# Patient Record
Sex: FEMALE | Race: WHITE | NOT HISPANIC OR LATINO | URBAN - METROPOLITAN AREA
[De-identification: names, ages, dates, MRNs, and addresses within clinical notes are randomized per-mention and may not be internally consistent; named-entity substitution may affect disease eponyms.]

---

## 2024-01-12 ENCOUNTER — EMERGENCY (EMERGENCY)
Facility: HOSPITAL | Age: 59
LOS: 0 days | Discharge: HOME | End: 2024-01-12
Attending: EMERGENCY MEDICINE
Payer: COMMERCIAL

## 2024-01-12 VITALS
WEIGHT: 160.06 LBS | TEMPERATURE: 98 F | HEART RATE: 86 BPM | RESPIRATION RATE: 18 BRPM | HEIGHT: 64 IN | SYSTOLIC BLOOD PRESSURE: 110 MMHG | DIASTOLIC BLOOD PRESSURE: 65 MMHG

## 2024-01-12 DIAGNOSIS — R10.9 UNSPECIFIED ABDOMINAL PAIN: ICD-10-CM

## 2024-01-12 DIAGNOSIS — Z88.8 ALLERGY STATUS TO OTHER DRUGS, MEDICAMENTS AND BIOLOGICAL SUBSTANCES: ICD-10-CM

## 2024-01-12 DIAGNOSIS — K57.30 DIVERTICULOSIS OF LARGE INTESTINE WITHOUT PERFORATION OR ABSCESS WITHOUT BLEEDING: ICD-10-CM

## 2024-01-12 DIAGNOSIS — K86.2 CYST OF PANCREAS: ICD-10-CM

## 2024-01-12 DIAGNOSIS — Z87.19 PERSONAL HISTORY OF OTHER DISEASES OF THE DIGESTIVE SYSTEM: ICD-10-CM

## 2024-01-12 DIAGNOSIS — R30.9 PAINFUL MICTURITION, UNSPECIFIED: ICD-10-CM

## 2024-01-12 LAB
ALBUMIN SERPL ELPH-MCNC: 4.7 G/DL — SIGNIFICANT CHANGE UP (ref 3.5–5.2)
ALBUMIN SERPL ELPH-MCNC: 4.7 G/DL — SIGNIFICANT CHANGE UP (ref 3.5–5.2)
ALP SERPL-CCNC: 69 U/L — SIGNIFICANT CHANGE UP (ref 30–115)
ALP SERPL-CCNC: 69 U/L — SIGNIFICANT CHANGE UP (ref 30–115)
ALT FLD-CCNC: 17 U/L — SIGNIFICANT CHANGE UP (ref 0–41)
ALT FLD-CCNC: 17 U/L — SIGNIFICANT CHANGE UP (ref 0–41)
ANION GAP SERPL CALC-SCNC: 10 MMOL/L — SIGNIFICANT CHANGE UP (ref 7–14)
ANION GAP SERPL CALC-SCNC: 10 MMOL/L — SIGNIFICANT CHANGE UP (ref 7–14)
APPEARANCE UR: CLEAR — SIGNIFICANT CHANGE UP
APPEARANCE UR: CLEAR — SIGNIFICANT CHANGE UP
AST SERPL-CCNC: 17 U/L — SIGNIFICANT CHANGE UP (ref 0–41)
AST SERPL-CCNC: 17 U/L — SIGNIFICANT CHANGE UP (ref 0–41)
BACTERIA # UR AUTO: NEGATIVE /HPF — SIGNIFICANT CHANGE UP
BACTERIA # UR AUTO: NEGATIVE /HPF — SIGNIFICANT CHANGE UP
BASOPHILS # BLD AUTO: 0.05 K/UL — SIGNIFICANT CHANGE UP (ref 0–0.2)
BASOPHILS # BLD AUTO: 0.05 K/UL — SIGNIFICANT CHANGE UP (ref 0–0.2)
BASOPHILS NFR BLD AUTO: 0.3 % — SIGNIFICANT CHANGE UP (ref 0–1)
BASOPHILS NFR BLD AUTO: 0.3 % — SIGNIFICANT CHANGE UP (ref 0–1)
BILIRUB SERPL-MCNC: 0.6 MG/DL — SIGNIFICANT CHANGE UP (ref 0.2–1.2)
BILIRUB SERPL-MCNC: 0.6 MG/DL — SIGNIFICANT CHANGE UP (ref 0.2–1.2)
BILIRUB UR-MCNC: NEGATIVE — SIGNIFICANT CHANGE UP
BILIRUB UR-MCNC: NEGATIVE — SIGNIFICANT CHANGE UP
BUN SERPL-MCNC: 56 MG/DL — HIGH (ref 10–20)
BUN SERPL-MCNC: 56 MG/DL — HIGH (ref 10–20)
CALCIUM SERPL-MCNC: 9.4 MG/DL — SIGNIFICANT CHANGE UP (ref 8.4–10.5)
CALCIUM SERPL-MCNC: 9.4 MG/DL — SIGNIFICANT CHANGE UP (ref 8.4–10.5)
CAST: 13 /LPF — HIGH (ref 0–4)
CAST: 13 /LPF — HIGH (ref 0–4)
CHLORIDE SERPL-SCNC: 104 MMOL/L — SIGNIFICANT CHANGE UP (ref 98–110)
CHLORIDE SERPL-SCNC: 104 MMOL/L — SIGNIFICANT CHANGE UP (ref 98–110)
CO2 SERPL-SCNC: 22 MMOL/L — SIGNIFICANT CHANGE UP (ref 17–32)
CO2 SERPL-SCNC: 22 MMOL/L — SIGNIFICANT CHANGE UP (ref 17–32)
COLOR SPEC: YELLOW — SIGNIFICANT CHANGE UP
COLOR SPEC: YELLOW — SIGNIFICANT CHANGE UP
CREAT SERPL-MCNC: 1.5 MG/DL — SIGNIFICANT CHANGE UP (ref 0.7–1.5)
CREAT SERPL-MCNC: 1.5 MG/DL — SIGNIFICANT CHANGE UP (ref 0.7–1.5)
DIFF PNL FLD: NEGATIVE — SIGNIFICANT CHANGE UP
DIFF PNL FLD: NEGATIVE — SIGNIFICANT CHANGE UP
EGFR: 40 ML/MIN/1.73M2 — LOW
EGFR: 40 ML/MIN/1.73M2 — LOW
EOSINOPHIL # BLD AUTO: 0.12 K/UL — SIGNIFICANT CHANGE UP (ref 0–0.7)
EOSINOPHIL # BLD AUTO: 0.12 K/UL — SIGNIFICANT CHANGE UP (ref 0–0.7)
EOSINOPHIL NFR BLD AUTO: 0.8 % — SIGNIFICANT CHANGE UP (ref 0–8)
EOSINOPHIL NFR BLD AUTO: 0.8 % — SIGNIFICANT CHANGE UP (ref 0–8)
GLUCOSE SERPL-MCNC: 100 MG/DL — HIGH (ref 70–99)
GLUCOSE SERPL-MCNC: 100 MG/DL — HIGH (ref 70–99)
GLUCOSE UR QL: NEGATIVE MG/DL — SIGNIFICANT CHANGE UP
GLUCOSE UR QL: NEGATIVE MG/DL — SIGNIFICANT CHANGE UP
HCT VFR BLD CALC: 46.8 % — SIGNIFICANT CHANGE UP (ref 37–47)
HCT VFR BLD CALC: 46.8 % — SIGNIFICANT CHANGE UP (ref 37–47)
HGB BLD-MCNC: 15.7 G/DL — SIGNIFICANT CHANGE UP (ref 12–16)
HGB BLD-MCNC: 15.7 G/DL — SIGNIFICANT CHANGE UP (ref 12–16)
IMM GRANULOCYTES NFR BLD AUTO: 0.7 % — HIGH (ref 0.1–0.3)
IMM GRANULOCYTES NFR BLD AUTO: 0.7 % — HIGH (ref 0.1–0.3)
KETONES UR-MCNC: NEGATIVE MG/DL — SIGNIFICANT CHANGE UP
KETONES UR-MCNC: NEGATIVE MG/DL — SIGNIFICANT CHANGE UP
LEUKOCYTE ESTERASE UR-ACNC: NEGATIVE — SIGNIFICANT CHANGE UP
LEUKOCYTE ESTERASE UR-ACNC: NEGATIVE — SIGNIFICANT CHANGE UP
LIDOCAIN IGE QN: 98 U/L — HIGH (ref 7–60)
LIDOCAIN IGE QN: 98 U/L — HIGH (ref 7–60)
LYMPHOCYTES # BLD AUTO: 14.1 % — LOW (ref 20.5–51.1)
LYMPHOCYTES # BLD AUTO: 14.1 % — LOW (ref 20.5–51.1)
LYMPHOCYTES # BLD AUTO: 2.07 K/UL — SIGNIFICANT CHANGE UP (ref 1.2–3.4)
LYMPHOCYTES # BLD AUTO: 2.07 K/UL — SIGNIFICANT CHANGE UP (ref 1.2–3.4)
MCHC RBC-ENTMCNC: 31.7 PG — HIGH (ref 27–31)
MCHC RBC-ENTMCNC: 31.7 PG — HIGH (ref 27–31)
MCHC RBC-ENTMCNC: 33.5 G/DL — SIGNIFICANT CHANGE UP (ref 32–37)
MCHC RBC-ENTMCNC: 33.5 G/DL — SIGNIFICANT CHANGE UP (ref 32–37)
MCV RBC AUTO: 94.5 FL — SIGNIFICANT CHANGE UP (ref 81–99)
MCV RBC AUTO: 94.5 FL — SIGNIFICANT CHANGE UP (ref 81–99)
MONOCYTES # BLD AUTO: 0.93 K/UL — HIGH (ref 0.1–0.6)
MONOCYTES # BLD AUTO: 0.93 K/UL — HIGH (ref 0.1–0.6)
MONOCYTES NFR BLD AUTO: 6.3 % — SIGNIFICANT CHANGE UP (ref 1.7–9.3)
MONOCYTES NFR BLD AUTO: 6.3 % — SIGNIFICANT CHANGE UP (ref 1.7–9.3)
NEUTROPHILS # BLD AUTO: 11.39 K/UL — HIGH (ref 1.4–6.5)
NEUTROPHILS # BLD AUTO: 11.39 K/UL — HIGH (ref 1.4–6.5)
NEUTROPHILS NFR BLD AUTO: 77.8 % — HIGH (ref 42.2–75.2)
NEUTROPHILS NFR BLD AUTO: 77.8 % — HIGH (ref 42.2–75.2)
NITRITE UR-MCNC: NEGATIVE — SIGNIFICANT CHANGE UP
NITRITE UR-MCNC: NEGATIVE — SIGNIFICANT CHANGE UP
NRBC # BLD: 0 /100 WBCS — SIGNIFICANT CHANGE UP (ref 0–0)
NRBC # BLD: 0 /100 WBCS — SIGNIFICANT CHANGE UP (ref 0–0)
PH UR: 5.5 — SIGNIFICANT CHANGE UP (ref 5–8)
PH UR: 5.5 — SIGNIFICANT CHANGE UP (ref 5–8)
PLATELET # BLD AUTO: 290 K/UL — SIGNIFICANT CHANGE UP (ref 130–400)
PLATELET # BLD AUTO: 290 K/UL — SIGNIFICANT CHANGE UP (ref 130–400)
PMV BLD: 10.3 FL — SIGNIFICANT CHANGE UP (ref 7.4–10.4)
PMV BLD: 10.3 FL — SIGNIFICANT CHANGE UP (ref 7.4–10.4)
POTASSIUM SERPL-MCNC: 5.6 MMOL/L — HIGH (ref 3.5–5)
POTASSIUM SERPL-MCNC: 5.6 MMOL/L — HIGH (ref 3.5–5)
POTASSIUM SERPL-SCNC: 5.6 MMOL/L — HIGH (ref 3.5–5)
POTASSIUM SERPL-SCNC: 5.6 MMOL/L — HIGH (ref 3.5–5)
PROT SERPL-MCNC: 7.3 G/DL — SIGNIFICANT CHANGE UP (ref 6–8)
PROT SERPL-MCNC: 7.3 G/DL — SIGNIFICANT CHANGE UP (ref 6–8)
PROT UR-MCNC: 30 MG/DL
PROT UR-MCNC: 30 MG/DL
RBC # BLD: 4.95 M/UL — SIGNIFICANT CHANGE UP (ref 4.2–5.4)
RBC # BLD: 4.95 M/UL — SIGNIFICANT CHANGE UP (ref 4.2–5.4)
RBC # FLD: 13.9 % — SIGNIFICANT CHANGE UP (ref 11.5–14.5)
RBC # FLD: 13.9 % — SIGNIFICANT CHANGE UP (ref 11.5–14.5)
RBC CASTS # UR COMP ASSIST: 2 /HPF — SIGNIFICANT CHANGE UP (ref 0–4)
RBC CASTS # UR COMP ASSIST: 2 /HPF — SIGNIFICANT CHANGE UP (ref 0–4)
SODIUM SERPL-SCNC: 136 MMOL/L — SIGNIFICANT CHANGE UP (ref 135–146)
SODIUM SERPL-SCNC: 136 MMOL/L — SIGNIFICANT CHANGE UP (ref 135–146)
SP GR SPEC: 1.02 — SIGNIFICANT CHANGE UP (ref 1–1.03)
SP GR SPEC: 1.02 — SIGNIFICANT CHANGE UP (ref 1–1.03)
SQUAMOUS # UR AUTO: 1 /HPF — SIGNIFICANT CHANGE UP (ref 0–5)
SQUAMOUS # UR AUTO: 1 /HPF — SIGNIFICANT CHANGE UP (ref 0–5)
UROBILINOGEN FLD QL: 1 MG/DL — SIGNIFICANT CHANGE UP (ref 0.2–1)
UROBILINOGEN FLD QL: 1 MG/DL — SIGNIFICANT CHANGE UP (ref 0.2–1)
WBC # BLD: 14.67 K/UL — HIGH (ref 4.8–10.8)
WBC # BLD: 14.67 K/UL — HIGH (ref 4.8–10.8)
WBC # FLD AUTO: 14.67 K/UL — HIGH (ref 4.8–10.8)
WBC # FLD AUTO: 14.67 K/UL — HIGH (ref 4.8–10.8)
WBC UR QL: 1 /HPF — SIGNIFICANT CHANGE UP (ref 0–5)
WBC UR QL: 1 /HPF — SIGNIFICANT CHANGE UP (ref 0–5)

## 2024-01-12 PROCEDURE — 85025 COMPLETE CBC W/AUTO DIFF WBC: CPT

## 2024-01-12 PROCEDURE — 74177 CT ABD & PELVIS W/CONTRAST: CPT | Mod: MA

## 2024-01-12 PROCEDURE — 87086 URINE CULTURE/COLONY COUNT: CPT

## 2024-01-12 PROCEDURE — 81001 URINALYSIS AUTO W/SCOPE: CPT

## 2024-01-12 PROCEDURE — 99284 EMERGENCY DEPT VISIT MOD MDM: CPT | Mod: 25

## 2024-01-12 PROCEDURE — 80053 COMPREHEN METABOLIC PANEL: CPT

## 2024-01-12 PROCEDURE — 36415 COLL VENOUS BLD VENIPUNCTURE: CPT

## 2024-01-12 PROCEDURE — 83690 ASSAY OF LIPASE: CPT

## 2024-01-12 PROCEDURE — 74177 CT ABD & PELVIS W/CONTRAST: CPT | Mod: 26,MA

## 2024-01-12 PROCEDURE — 99285 EMERGENCY DEPT VISIT HI MDM: CPT

## 2024-01-12 RX ORDER — SODIUM CHLORIDE 9 MG/ML
1000 INJECTION INTRAMUSCULAR; INTRAVENOUS; SUBCUTANEOUS ONCE
Refills: 0 | Status: COMPLETED | OUTPATIENT
Start: 2024-01-12 | End: 2024-01-12

## 2024-01-12 RX ORDER — CIPROFLOXACIN LACTATE 400MG/40ML
1 VIAL (ML) INTRAVENOUS
Qty: 20 | Refills: 0
Start: 2024-01-12 | End: 2024-01-21

## 2024-01-12 RX ORDER — METRONIDAZOLE 500 MG
1 TABLET ORAL
Qty: 20 | Refills: 0
Start: 2024-01-12 | End: 2024-01-21

## 2024-01-12 RX ADMIN — SODIUM CHLORIDE 1000 MILLILITER(S): 9 INJECTION INTRAMUSCULAR; INTRAVENOUS; SUBCUTANEOUS at 11:22

## 2024-01-12 NOTE — ED PROVIDER NOTE - PATIENT PORTAL LINK FT
You can access the FollowMyHealth Patient Portal offered by [facility] by registering at the following website: [email or web address]. By joining [employer]’s FollowMyHealth portal, you will also be able to view your health information using other applications (apps) compatible with our system. You can access the FollowMyHealth Patient Portal offered by [facility] by registering at the following website: [email or web address]. By joining [employer]’s FollowMyHealth portal, you will also be able to view your health information using other applications (apps) compatible with our system.

## 2024-01-12 NOTE — ED ADULT NURSE NOTE - NSFALLUNIVINTERV_ED_ALL_ED
Bed/Stretcher in lowest position, wheels locked, appropriate side rails in place/Call bell, personal items and telephone in reach/Instruct patient to call for assistance before getting out of bed/chair/stretcher/Non-slip footwear applied when patient is off stretcher/[place] to call system/Physically safe environment - no spills, clutter or unnecessary equipment/Purposeful proactive rounding/Room/bathroom lighting operational, light cord in reach Bed/Stretcher in lowest position, wheels locked, appropriate side rails in place/Call bell, personal items and telephone in reach/Instruct patient to call for assistance before getting out of bed/chair/stretcher/Non-slip footwear applied when patient is off stretcher/[place] to call system/Physically safe environment - no spills, clutter or unnecessary equipment/Purposeful proactive rounding/Room/bathroom lighting operational, light cord in reach

## 2024-01-12 NOTE — ED ADULT TRIAGE NOTE - AS HEIGHT TYPE
stated
Anal fistula    Diabetes mellitus  type II  Dyslipidemia    Hyperuricemia    Sprain, bicep

## 2024-01-12 NOTE — ED PROVIDER NOTE - PHYSICAL EXAMINATION
VITAL SIGNS: I have reviewed nursing notes and confirm.  CONSTITUTIONAL: well-appearing, non-toxic, NAD  SKIN: Warm dry, normal skin turgor  HEAD: NCAT  EYES: EOMI, PERRLA, no scleral icterus  ENT: Moist mucous membranes, normal pharynx with no erythema or exudates  NECK: Supple; non tender. Full ROM.   CARD: RRR, no murmurs, rubs or gallops  RESP: clear to ausculation b/l.  No rales, rhonchi, or wheezing.  ABD: soft, + BS, (+) LLQ tenderness, non-distended, no rebound or guarding. No CVA tenderness  EXT: Full ROM, no bony tenderness, no pedal edema, no calf tenderness  PSYCH: Cooperative, appropriate.

## 2024-01-12 NOTE — ED PROVIDER NOTE - OBJECTIVE STATEMENT
Patient is a 58-year-old female past medical history of diverticulitis presenting for evaluation of left sided abdominal pain for the last 3 days.  Patient states left upper quadrant pain moved to the left lower quadrant.  She denies any fevers, chills, nausea, vomiting, chest pain, shortness of breath, hematuria, diarrhea, constipation, Vaginal bleeding, vaginal discharge.  Patient admits to mild discomfort with urination but denies urinary frequency or urgency.  Patient is postmenopausal.

## 2024-01-12 NOTE — ED PROVIDER NOTE - CLINICAL SUMMARY MEDICAL DECISION MAKING FREE TEXT BOX
58-year-old woman, history of diverticulitis in the past complains of left-sided lower abdominal pain over the last 3 days without associated fever, chills, nausea, vomiting.  No urinary symptoms.  Vital signs, exam as noted, left lower quadrant tenderness without peritoneal signs.  Labs with mild leukocytosis of 15, lipase mildly elevated, UA not consistent with infection.  CT with mild sigmoid diverticulitis, incidental pancreatic head cyst noted as well.  Results discussed with patient and she will follow-up PMD.  Antibiotics prescribed.

## 2024-01-13 LAB
CULTURE RESULTS: NO GROWTH — SIGNIFICANT CHANGE UP
CULTURE RESULTS: NO GROWTH — SIGNIFICANT CHANGE UP
SPECIMEN SOURCE: SIGNIFICANT CHANGE UP
SPECIMEN SOURCE: SIGNIFICANT CHANGE UP

## 2024-01-29 PROBLEM — Z00.00 ENCOUNTER FOR PREVENTIVE HEALTH EXAMINATION: Status: ACTIVE | Noted: 2024-01-29

## 2024-01-30 ENCOUNTER — OUTPATIENT (OUTPATIENT)
Dept: OUTPATIENT SERVICES | Facility: HOSPITAL | Age: 59
LOS: 1 days | End: 2024-01-30
Payer: COMMERCIAL

## 2024-01-30 DIAGNOSIS — Z12.31 ENCOUNTER FOR SCREENING MAMMOGRAM FOR MALIGNANT NEOPLASM OF BREAST: ICD-10-CM

## 2024-01-30 PROCEDURE — 77063 BREAST TOMOSYNTHESIS BI: CPT

## 2024-01-30 PROCEDURE — 77067 SCR MAMMO BI INCL CAD: CPT

## 2024-01-30 PROCEDURE — 77067 SCR MAMMO BI INCL CAD: CPT | Mod: 26

## 2024-01-30 PROCEDURE — 77063 BREAST TOMOSYNTHESIS BI: CPT | Mod: 26

## 2024-01-31 DIAGNOSIS — Z12.31 ENCOUNTER FOR SCREENING MAMMOGRAM FOR MALIGNANT NEOPLASM OF BREAST: ICD-10-CM

## 2024-02-03 ENCOUNTER — OUTPATIENT (OUTPATIENT)
Dept: OUTPATIENT SERVICES | Facility: HOSPITAL | Age: 59
LOS: 1 days | End: 2024-02-03
Payer: COMMERCIAL

## 2024-02-03 DIAGNOSIS — Z00.8 ENCOUNTER FOR OTHER GENERAL EXAMINATION: ICD-10-CM

## 2024-02-03 DIAGNOSIS — E04.9 NONTOXIC GOITER, UNSPECIFIED: ICD-10-CM

## 2024-02-03 PROCEDURE — 76536 US EXAM OF HEAD AND NECK: CPT

## 2024-02-03 PROCEDURE — 76536 US EXAM OF HEAD AND NECK: CPT | Mod: 26

## 2024-02-04 DIAGNOSIS — E04.9 NONTOXIC GOITER, UNSPECIFIED: ICD-10-CM

## 2024-02-05 ENCOUNTER — OUTPATIENT (OUTPATIENT)
Dept: OUTPATIENT SERVICES | Facility: HOSPITAL | Age: 59
LOS: 1 days | End: 2024-02-05
Payer: COMMERCIAL

## 2024-02-05 DIAGNOSIS — Z00.8 ENCOUNTER FOR OTHER GENERAL EXAMINATION: ICD-10-CM

## 2024-02-05 DIAGNOSIS — Z13.820 ENCOUNTER FOR SCREENING FOR OSTEOPOROSIS: ICD-10-CM

## 2024-02-05 PROCEDURE — 77080 DXA BONE DENSITY AXIAL: CPT

## 2024-02-05 PROCEDURE — 77080 DXA BONE DENSITY AXIAL: CPT | Mod: 26

## 2024-02-06 DIAGNOSIS — Z13.820 ENCOUNTER FOR SCREENING FOR OSTEOPOROSIS: ICD-10-CM

## 2024-02-09 ENCOUNTER — APPOINTMENT (OUTPATIENT)
Dept: CARDIOLOGY | Facility: CLINIC | Age: 59
End: 2024-02-09
Payer: COMMERCIAL

## 2024-02-09 VITALS
WEIGHT: 163 LBS | DIASTOLIC BLOOD PRESSURE: 80 MMHG | BODY MASS INDEX: 27.83 KG/M2 | HEART RATE: 87 BPM | HEIGHT: 64 IN | SYSTOLIC BLOOD PRESSURE: 120 MMHG

## 2024-02-09 DIAGNOSIS — E11.9 TYPE 2 DIABETES MELLITUS W/OUT COMPLICATIONS: ICD-10-CM

## 2024-02-09 DIAGNOSIS — F17.200 NICOTINE DEPENDENCE, UNSPECIFIED, UNCOMPLICATED: ICD-10-CM

## 2024-02-09 PROCEDURE — 93000 ELECTROCARDIOGRAM COMPLETE: CPT

## 2024-02-09 PROCEDURE — 99204 OFFICE O/P NEW MOD 45 MIN: CPT | Mod: 25

## 2024-02-09 RX ORDER — ENALAPRIL MALEATE 20 MG/1
20 TABLET ORAL DAILY
Refills: 0 | Status: ACTIVE | COMMUNITY

## 2024-02-09 RX ORDER — METOPROLOL SUCCINATE 100 MG/1
100 TABLET, EXTENDED RELEASE ORAL DAILY
Refills: 0 | Status: ACTIVE | COMMUNITY

## 2024-02-09 RX ORDER — HYDROCHLOROTHIAZIDE 12.5 MG/1
12.5 TABLET ORAL
Qty: 90 | Refills: 1 | Status: ACTIVE | COMMUNITY
Start: 2024-02-09 | End: 1900-01-01

## 2024-02-09 RX ORDER — GLIPIZIDE 5 MG/1
5 TABLET, EXTENDED RELEASE ORAL DAILY
Refills: 0 | Status: ACTIVE | COMMUNITY

## 2024-02-09 RX ORDER — ROSUVASTATIN CALCIUM 5 MG/1
5 TABLET, FILM COATED ORAL DAILY
Refills: 0 | Status: ACTIVE | COMMUNITY

## 2024-02-09 RX ORDER — SPIRONOLACTONE 25 MG/1
25 TABLET ORAL DAILY
Refills: 0 | Status: DISCONTINUED | COMMUNITY
End: 2024-02-09

## 2024-02-09 RX ORDER — ASPIRIN 81 MG/1
81 TABLET, COATED ORAL DAILY
Refills: 0 | Status: ACTIVE | COMMUNITY

## 2024-02-10 PROBLEM — F17.200 CURRENT EVERY DAY SMOKER: Status: ACTIVE | Noted: 2024-02-09

## 2024-02-10 PROBLEM — E11.9 TYPE 2 DIABETES MELLITUS WITHOUT COMPLICATION, UNSPECIFIED WHETHER LONG TERM INSULIN USE: Status: ACTIVE | Noted: 2024-02-09

## 2024-02-10 NOTE — HISTORY OF PRESENT ILLNESS
[FreeTextEntry1] : 58 year old female with ?CVA 15 years ago, since then has mildly impaired speech, HTN, DM, HLD, diverticulitis.  Pt presents for a cardiac evaluation. She was referred by her PCP Dr. Dunne. She denies chest pain or SOB, walks 20k steps a day. She is c/o leg cramps at night, relieved when she gets up and walks around, for the past 3 months.    GFR 64 LDL 88 TSH 0.26.

## 2024-02-10 NOTE — REVIEW OF SYSTEMS
[Blurry Vision] : no blurred vision [Dyspnea on exertion] : not dyspnea during exertion [Palpitations] : no palpitations [Chest Discomfort] : no chest discomfort [Anxiety] : no anxiety [Negative] : Gastrointestinal

## 2024-02-10 NOTE — ASSESSMENT
[FreeTextEntry1] : 58 YOF smoker. ? prior CVA. HTN controlled but unable to continue both Enalapril and Aldactone due to hyperkalemia.  HLD. DMII. Leg cramps, varicose veins and reduced pedal pulses.  Plan: Continue Enalapril, Metoprolol. Switch from Aldactone to HCTZ 12.5 mg daily. Continue Rosuvastatin. Smoking cessation d/w patient. 2D ECHO. VADIM/PVR of the LE. Venous duplex with reflux study of the LE. F/u after the tests.  Jerald Howard MD

## 2024-02-10 NOTE — PHYSICAL EXAM
[Well Developed] : well developed [Well Nourished] : well nourished [No Acute Distress] : no acute distress [Normal Conjunctiva] : normal conjunctiva [Normal Venous Pressure] : normal venous pressure [No Carotid Bruit] : no carotid bruit [Normal S1, S2] : normal S1, S2 [No Murmur] : no murmur [No Rub] : no rub [No Gallop] : no gallop [Clear Lung Fields] : clear lung fields [Good Air Entry] : good air entry [No Respiratory Distress] : no respiratory distress  [Soft] : abdomen soft [Non Tender] : non-tender [No Masses/organomegaly] : no masses/organomegaly [Normal Bowel Sounds] : normal bowel sounds [Normal Gait] : normal gait [No Edema] : no edema [No Cyanosis] : no cyanosis [No Clubbing] : no clubbing [No Varicosities] : no varicosities [No Rash] : no rash [No Skin Lesions] : no skin lesions [Moves all extremities] : moves all extremities [No Focal Deficits] : no focal deficits [Normal Speech] : normal speech [Alert and Oriented] : alert and oriented [Normal memory] : normal memory [Venous varicosities] : venous varicosities [Diminished Pedal Pulses ___] : diminished pedal pulses [unfilled]

## 2024-02-21 ENCOUNTER — APPOINTMENT (OUTPATIENT)
Dept: CARDIOLOGY | Facility: CLINIC | Age: 59
End: 2024-02-21
Payer: COMMERCIAL

## 2024-02-21 PROCEDURE — 93970 EXTREMITY STUDY: CPT

## 2024-02-21 PROCEDURE — 93923 UPR/LXTR ART STDY 3+ LVLS: CPT

## 2024-04-18 ENCOUNTER — APPOINTMENT (OUTPATIENT)
Dept: CARDIOLOGY | Facility: CLINIC | Age: 59
End: 2024-04-18
Payer: COMMERCIAL

## 2024-04-18 VITALS — DIASTOLIC BLOOD PRESSURE: 80 MMHG | SYSTOLIC BLOOD PRESSURE: 138 MMHG

## 2024-04-18 VITALS
HEART RATE: 67 BPM | HEIGHT: 64 IN | DIASTOLIC BLOOD PRESSURE: 82 MMHG | SYSTOLIC BLOOD PRESSURE: 146 MMHG | WEIGHT: 177 LBS | BODY MASS INDEX: 30.22 KG/M2

## 2024-04-18 DIAGNOSIS — M10.9 GOUT, UNSPECIFIED: ICD-10-CM

## 2024-04-18 DIAGNOSIS — I10 ESSENTIAL (PRIMARY) HYPERTENSION: ICD-10-CM

## 2024-04-18 DIAGNOSIS — E78.5 HYPERLIPIDEMIA, UNSPECIFIED: ICD-10-CM

## 2024-04-18 DIAGNOSIS — M79.606 PAIN IN LEG, UNSPECIFIED: ICD-10-CM

## 2024-04-18 DIAGNOSIS — G25.0 ESSENTIAL TREMOR: ICD-10-CM

## 2024-04-18 PROCEDURE — 93306 TTE W/DOPPLER COMPLETE: CPT

## 2024-04-18 PROCEDURE — 93000 ELECTROCARDIOGRAM COMPLETE: CPT

## 2024-04-18 PROCEDURE — 99214 OFFICE O/P EST MOD 30 MIN: CPT | Mod: 25

## 2024-04-18 RX ORDER — ALLOPURINOL 100 MG/1
100 TABLET ORAL
Refills: 0 | Status: ACTIVE | COMMUNITY
Start: 2024-04-18

## 2024-04-18 NOTE — HISTORY OF PRESENT ILLNESS
[FreeTextEntry1] : 58 year old female with ?CVA 15 years ago, since then has mildly impaired speech, HTN, DM, HLD, diverticulitis, Tremors, Gout   Pt presents for a cardiac evaluation. She was referred by her PCP Dr. Dunne. She denies chest pain or SOB, walks 20k steps a day. She is c/o leg cramps at night, relieved when she gets up and walks around, for the past 3 months.    Pt is seen for f.u.  Denies chest pain, active all day. Denies SOB, no palpitations, no edema.  Pt decreased smoking to 2 to 3 cig per day 02/23/24 Venous Duplex no  DVT No PVD  VADIM with PVR WNL  Had TTE today   01/29/24 BUN 28/ Cr 1 GFR 64 LDL 88 Trig 69  TSH 0.26.

## 2024-04-18 NOTE — REVIEW OF SYSTEMS
[Negative] : Neurological [Blurry Vision] : no blurred vision [Dyspnea on exertion] : not dyspnea during exertion [Chest Discomfort] : no chest discomfort [Palpitations] : no palpitations [Anxiety] : no anxiety

## 2024-04-18 NOTE — PHYSICAL EXAM
[Well Developed] : well developed [Well Nourished] : well nourished [No Acute Distress] : no acute distress [Normal Conjunctiva] : normal conjunctiva [Normal Venous Pressure] : normal venous pressure [No Carotid Bruit] : no carotid bruit [Normal S1, S2] : normal S1, S2 [No Murmur] : no murmur [No Rub] : no rub [No Gallop] : no gallop [Clear Lung Fields] : clear lung fields [Good Air Entry] : good air entry [No Respiratory Distress] : no respiratory distress  [Soft] : abdomen soft [Non Tender] : non-tender [Normal Bowel Sounds] : normal bowel sounds [Normal Gait] : normal gait [No Edema] : no edema [No Cyanosis] : no cyanosis [No Clubbing] : no clubbing [Diminished Pedal Pulses ___] : diminished pedal pulses [unfilled] [Venous varicosities] : venous varicosities [No Rash] : no rash [No Skin Lesions] : no skin lesions [Moves all extremities] : moves all extremities [No Focal Deficits] : no focal deficits [Normal Speech] : normal speech [Alert and Oriented] : alert and oriented [Normal memory] : normal memory

## 2024-08-05 ENCOUNTER — RX RENEWAL (OUTPATIENT)
Age: 59
End: 2024-08-05

## 2024-09-12 ENCOUNTER — EMERGENCY (EMERGENCY)
Facility: HOSPITAL | Age: 59
LOS: 0 days | Discharge: HOME | End: 2024-09-12
Attending: STUDENT IN AN ORGANIZED HEALTH CARE EDUCATION/TRAINING PROGRAM
Payer: COMMERCIAL

## 2024-09-12 VITALS
TEMPERATURE: 98 F | RESPIRATION RATE: 17 BRPM | WEIGHT: 175.05 LBS | OXYGEN SATURATION: 97 % | HEART RATE: 86 BPM | DIASTOLIC BLOOD PRESSURE: 81 MMHG | SYSTOLIC BLOOD PRESSURE: 174 MMHG

## 2024-09-12 DIAGNOSIS — R73.03 PREDIABETES: ICD-10-CM

## 2024-09-12 DIAGNOSIS — M25.531 PAIN IN RIGHT WRIST: ICD-10-CM

## 2024-09-12 DIAGNOSIS — Z88.8 ALLERGY STATUS TO OTHER DRUGS, MEDICAMENTS AND BIOLOGICAL SUBSTANCES: ICD-10-CM

## 2024-09-12 DIAGNOSIS — M25.541 PAIN IN JOINTS OF RIGHT HAND: ICD-10-CM

## 2024-09-12 PROCEDURE — 96372 THER/PROPH/DIAG INJ SC/IM: CPT

## 2024-09-12 PROCEDURE — 73100 X-RAY EXAM OF WRIST: CPT | Mod: RT

## 2024-09-12 PROCEDURE — 99284 EMERGENCY DEPT VISIT MOD MDM: CPT

## 2024-09-12 PROCEDURE — 73100 X-RAY EXAM OF WRIST: CPT | Mod: 26,RT

## 2024-09-12 PROCEDURE — 99283 EMERGENCY DEPT VISIT LOW MDM: CPT | Mod: 25

## 2024-09-12 RX ORDER — DEXAMETHASONE 0.75 MG
10 TABLET ORAL ONCE
Refills: 0 | Status: COMPLETED | OUTPATIENT
Start: 2024-09-12 | End: 2024-09-12

## 2024-09-12 RX ORDER — KETOROLAC TROMETHAMINE 30 MG/ML
30 INJECTION, SOLUTION INTRAMUSCULAR ONCE
Refills: 0 | Status: DISCONTINUED | OUTPATIENT
Start: 2024-09-12 | End: 2024-09-12

## 2024-09-12 RX ADMIN — KETOROLAC TROMETHAMINE 30 MILLIGRAM(S): 30 INJECTION, SOLUTION INTRAMUSCULAR at 20:56

## 2024-09-12 RX ADMIN — Medication 10 MILLIGRAM(S): at 20:56

## 2024-09-12 NOTE — ED ADULT NURSE NOTE - NSFALLUNIVINTERV_ED_ALL_ED
Bed/Stretcher in lowest position, wheels locked, appropriate side rails in place/Call bell, personal items and telephone in reach/Instruct patient to call for assistance before getting out of bed/chair/stretcher/Non-slip footwear applied when patient is off stretcher/[place] to call system/Physically safe environment - no spills, clutter or unnecessary equipment/Purposeful proactive rounding/Room/bathroom lighting operational, light cord in reach

## 2024-09-12 NOTE — ED ADULT TRIAGE NOTE - CHIEF COMPLAINT QUOTE
Pt came c/o non-traumatic right wrist and hand swelling and pain started Monday. Pt has limited motion in the right hand and hand is warm to touch, +radial pulse and sensory.

## 2024-09-12 NOTE — ED PROVIDER NOTE - CLINICAL SUMMARY MEDICAL DECISION MAKING FREE TEXT BOX
59-year-old presented today for evaluation of right wrist and hand pain.  Patient's x-ray was grossly unremarkable.  Patient instructed to follow-up outpatient with orthopedics.  Discharge at this time.  Patient is neurovascularly intact at this time

## 2024-09-12 NOTE — ED PROVIDER NOTE - PATIENT PORTAL LINK FT
You can access the FollowMyHealth Patient Portal offered by [facility] by registering at the following website: [email or web address]. By joining [employer]s FollowMyHealth portal, you will also be able to view your health information using other applications (apps) compatible with our system.

## 2024-09-12 NOTE — ED PROVIDER NOTE - OBJECTIVE STATEMENT
59 yold female to ED Pmhx Diverticulitis, benign tremor, sciatica, pDm; pt presents to Ed c/o rigth wrist and hand pain x 4 days - possibly related to lifting a fedex box; pt with similar 3 yrs ago resolved spontaneously; lab work for gout neg 1 yrs ago; pt denies fever, chills, n/v; denies similar pain to other joints;

## 2024-09-12 NOTE — ED PROVIDER NOTE - CARE PROVIDER_API CALL
Agatha Dunne  Internal Medicine  [address] [place] [id number]  Phone: [phone]  Fax: [phone]  Established Patient  Follow Up Time: Routine

## 2024-09-12 NOTE — ED PROVIDER NOTE - PHYSICAL EXAMINATION
Vital Signs: I have reviewed the initial vital signs.  Constitutional: well-nourished, appears stated age, no acute distress  Cardiovascular: regular rate, regular rhythm, well-perfused extremities  Musculoskeletal: right wrist and hand: + swelling mild warmth, no redness or lymphangitis; limited flex/ext/pronation/supination at wrist due to pain; sensation and pulses intact; elbow, shoulder and neck non tender;

## 2024-09-12 NOTE — ED PROVIDER NOTE - NSFOLLOWUPINSTRUCTIONS_ED_ALL_ED_FT
Our Emergency Department Referral Coordinators will be reaching out to you in the next 24-48 hours from 9:00am to 5:00pm with a follow up appointment. Please expect a phone call from the hospital in that time frame. If you do not receive a call or if you have any questions or concerns, you can reach them at   [phone]      Wrist Pain, Adult  There are many things that can cause wrist pain. Some common causes include:    An injury to the wrist area, such as a sprain, strain, or fracture.  Overuse of the joint.  A condition that causes increased pressure on a nerve in the wrist (carpal tunnel syndrome).  Wear and tear of the joints that occurs with aging (osteoarthritis).  A variety of other types of arthritis.    Sometimes, the cause of wrist pain is not known. Often, the pain goes away when you follow instructions from your health care provider for relieving pain at home, such as resting or icing the wrist. If your wrist pain continues, it is important to tell your health care provider.    Follow these instructions at home:  Rest the wrist area for at least 48 hours or as long as told by your health care provider.  If a splint or elastic bandage has been applied, use it as told by your health care provider.    Remove the splint or bandage only as told by your health care provider.  Loosen the splint or bandage if your fingers tingle, become numb, or turn cold or blue.    ImageIf directed, apply ice to the injured area.    If you have a removable splint or elastic bandage, remove it as told by your health care provider.  Put ice in a plastic bag.  Place a towel between your skin and the bag or between your splint or bandage and the bag.  Leave the ice on for 20 minutes, 2–3 times a day.    Keep your arm raised (elevated) above the level of your heart while you are sitting or lying down.  Take over-the-counter and prescription medicines only as told by your health care provider.  Keep all follow-up visits as told by your health care provider. This is important.  Contact a health care provider if:  You have a sudden sharp pain in the wrist, hand, or arm that is different or new.  The swelling or bruising on your wrist or hand gets worse.  Your skin becomes red, gets a rash, or has open sores.  Your pain does not get better or it gets worse.  Get help right away if:  You lose feeling in your fingers or hand.  Your fingers turn white, very red, or cold and blue.  You cannot move your fingers.  You have a fever or chills.  This information is not intended to replace advice given to you by your health care provider. Make sure you discuss any questions you have with your health care provider.

## 2024-09-18 ENCOUNTER — EMERGENCY (EMERGENCY)
Facility: HOSPITAL | Age: 59
LOS: 0 days | Discharge: HOME | End: 2024-09-18
Attending: EMERGENCY MEDICINE
Payer: COMMERCIAL

## 2024-09-18 VITALS
OXYGEN SATURATION: 96 % | HEIGHT: 66 IN | HEART RATE: 84 BPM | DIASTOLIC BLOOD PRESSURE: 83 MMHG | TEMPERATURE: 99 F | WEIGHT: 175.05 LBS | SYSTOLIC BLOOD PRESSURE: 136 MMHG | RESPIRATION RATE: 18 BRPM

## 2024-09-18 DIAGNOSIS — M25.531 PAIN IN RIGHT WRIST: ICD-10-CM

## 2024-09-18 DIAGNOSIS — I10 ESSENTIAL (PRIMARY) HYPERTENSION: ICD-10-CM

## 2024-09-18 PROCEDURE — 99283 EMERGENCY DEPT VISIT LOW MDM: CPT

## 2024-09-18 PROCEDURE — 99284 EMERGENCY DEPT VISIT MOD MDM: CPT

## 2024-09-18 RX ORDER — IBUPROFEN 600 MG
600 TABLET ORAL ONCE
Refills: 0 | Status: COMPLETED | OUTPATIENT
Start: 2024-09-18 | End: 2024-09-18

## 2024-09-18 RX ADMIN — Medication 600 MILLIGRAM(S): at 22:42

## 2024-09-18 NOTE — ED PROVIDER NOTE - PATIENT PORTAL LINK FT
You can access the FollowMyHealth Patient Portal offered by [facility] by registering at the following website: [email or web address]. By joining [employer]’s FollowMyHealth portal, you will also be able to view your health information using other applications (apps) compatible with our system.

## 2024-09-18 NOTE — ED PROVIDER NOTE - NSFOLLOWUPINSTRUCTIONS_ED_ALL_ED_FT
FOLLOW UP WITH YOUR ORTHOPEDIC DOCTOR THIS WEEK FOR REEVALUATION AND OUTPATIENT MRI AS PLANNED     RETURN TO ED IMMEDIATELY WITH ANY WORSENING SYMPTOMS, CHEST PAIN, SHORTNESS OF BREATH, ABDOMINAL PAIN, FEVERS, WEAKNESS, DIZZINESS, PERSISTENT OR SEVERE HEADACHE OR ANY OTHER CONCERNS.     Sprain    A sprain is a stretch or tear in one of the tough, fiber-like tissues (ligaments) in your body. This is caused by an injury to the area such as a twisting mechanism. Symptoms include pain, swelling, or bruising. Rest that area over the next several days and slowly resume activity when tolerated. Ice can help with swelling and pain.     SEEK IMMEDIATE MEDICAL CARE IF YOU HAVE ANY OF THE FOLLOWING SYMPTOMS: worsening pain, inability to move that body part, numbness or tingling.

## 2024-09-18 NOTE — ED PROVIDER NOTE - ATTENDING CONTRIBUTION TO CARE
59-year-old female with PMH HTN, essential tremor presents for evaluation of right wrist pain.  Patient seen about a week ago with negative x-ray.  Patient followed up with her orthopedist, Dr. Evan Maynard, who continued the splint and wrote her a referral for outpatient MRI.  Patient comes tonight requesting an MRI in ED stating she is still having some discomfort.  No worsening symptoms, fevers, numbness, weakness, swelling, fevers, chills, chest pain or shortness of breath.  Patient states she cannot get her MRI outpatient which she had copy of x-ray results that were done in ED.  Denies any additional complaints.  No trauma or falls. Denies any hx alcohol use.    VITAL SIGNS: noted  CONSTITUTIONAL: Well-developed; well-nourished; in no acute distress  HEAD: Normocephalic; atraumatic  EYES: PERRL, EOM intact; conjunctiva and sclera clear  ENT: No nasal discharge; airway clear. MMM  NECK: Supple; non tender.    CARD: S1, S2 normal; no murmurs, gallops, or rubs. Regular rate and rhythm  RESP: CTAB/L, no wheezes, rales or rhonchi  ABD: Normal bowel sounds; soft; non-distended; non-tender   EXT: Normal ROM. Distal pulses intact  NEURO: Alert, oriented. Grossly unremarkable. No focal deficits. mild essential tremor, baseline per pt  SKIN: Skin exam is warm and dry,  right wrist with slight decreased ROM, no erythema, no swelling, no deformity, + mild snuff box tenderness, cap refill wnl

## 2024-09-18 NOTE — ED PROVIDER NOTE - CLINICAL SUMMARY MEDICAL DECISION MAKING FREE TEXT BOX
Patient evaluated for right wrist pain, states she has referral for outpatient MRI and is following with her orthopedist Dr. Maynard but needed a copy of her x-ray results in order to get approved for MRI.  Was not sure if she could get MRI in ED.  Advised to follow-up for scheduling MRI tomorrow and follow-up with Dr. Maynard as no change in symptoms, advised continuing use of splint.  Patient comfortable with follow-up instructions and will speak with Dr. Maynard tomorrow.  Patient given copies of imaging results and strict return precautions advised and patient verbalized understanding.

## 2024-09-18 NOTE — ED PROVIDER NOTE - OBJECTIVE STATEMENT
59-year-old female coming with complaints of wrist pain. Patient reports she hurt her wrist about 3 weeks ago, came to the ED 5 days ago where they had x-rays performed and discharged to follow-up with orthopedist after splinting. Went to orthopedist to give her diclofenac gel with some relief but still with some pain to her second right digit as well as right wrist (initial pain was digits 3 through 5 and wrist). Patient says she needs an MRI or her x-ray read results from prior visit. Denying any worsening pain or symptoms.

## 2024-09-18 NOTE — ED PROVIDER NOTE - CARE PROVIDER_API CALL
Bogdan, Sergey Valentinovich  Pain Medicine  [address] [address]  Phone: [phone]  Fax: [phone]  Established Patient  Follow Up Time: 1-3 Days    Agatha Dunne  Internal Medicine  [address] [place] [id number]  Phone: [phone]  Fax: [phone]  Established Patient  Follow Up Time: 1-3 Days

## 2024-09-18 NOTE — ED PROVIDER NOTE - PHYSICAL EXAMINATION
CONSTITUTIONAL: NAD  SKIN: Warm dry  HEAD: NCAT  EYES: NL inspection  EXT: Right upper extremity initially in splint, on splint removed. Full range of motion of elbow. Limited range of motion of wrist due to pain. No gross deformities. Compartments soft. Pulses plus, sensation normal. No scaphoid tenderness on this provider's exam  NEURO: Grossly unremarkable  PSYCH: Cooperative, appropriate.

## 2024-09-18 NOTE — ED PROVIDER NOTE - PROVIDER TOKENS
PROVIDER:[TOKEN:[[id number]],FOLLOWUP:[1-3 Days],ESTABLISHEDPATIENT:[T]],PROVIDER:[TOKEN:[[id number]],FOLLOWUP:[1-3 Days],ESTABLISHEDPATIENT:[T]]

## 2024-09-18 NOTE — ED ADULT TRIAGE NOTE - CHIEF COMPLAINT QUOTE
Pt c/o worsening pain to right wrist and 1st digit. Pt seen recently on 9/12 in ed for right wrist pain. Pt f/u outpatient with ortho prescribed diclofenac gel for pain with no relief. Pt able to move digits WNL with splint

## 2024-09-19 PROBLEM — M54.30 SCIATICA, UNSPECIFIED SIDE: Chronic | Status: ACTIVE | Noted: 2024-09-12

## 2024-09-19 PROBLEM — I10 ESSENTIAL (PRIMARY) HYPERTENSION: Chronic | Status: ACTIVE | Noted: 2024-09-12

## 2024-10-12 ENCOUNTER — EMERGENCY (EMERGENCY)
Facility: HOSPITAL | Age: 59
LOS: 0 days | Discharge: HOME | End: 2024-10-12
Attending: EMERGENCY MEDICINE
Payer: COMMERCIAL

## 2024-10-12 VITALS
WEIGHT: 175.05 LBS | SYSTOLIC BLOOD PRESSURE: 133 MMHG | OXYGEN SATURATION: 100 % | DIASTOLIC BLOOD PRESSURE: 78 MMHG | HEIGHT: 66 IN | HEART RATE: 86 BPM | TEMPERATURE: 98 F | RESPIRATION RATE: 18 BRPM

## 2024-10-12 DIAGNOSIS — Z88.8 ALLERGY STATUS TO OTHER DRUGS, MEDICAMENTS AND BIOLOGICAL SUBSTANCES: ICD-10-CM

## 2024-10-12 DIAGNOSIS — Z87.19 PERSONAL HISTORY OF OTHER DISEASES OF THE DIGESTIVE SYSTEM: ICD-10-CM

## 2024-10-12 DIAGNOSIS — R10.32 LEFT LOWER QUADRANT PAIN: ICD-10-CM

## 2024-10-12 DIAGNOSIS — K57.92 DIVERTICULITIS OF INTESTINE, PART UNSPECIFIED, WITHOUT PERFORATION OR ABSCESS WITHOUT BLEEDING: ICD-10-CM

## 2024-10-12 LAB
ALBUMIN SERPL ELPH-MCNC: 3.6 G/DL — SIGNIFICANT CHANGE UP (ref 3.5–5.2)
ALP SERPL-CCNC: 65 U/L — SIGNIFICANT CHANGE UP (ref 30–115)
ALT FLD-CCNC: 17 U/L — SIGNIFICANT CHANGE UP (ref 0–41)
ANION GAP SERPL CALC-SCNC: 9 MMOL/L — SIGNIFICANT CHANGE UP (ref 7–14)
APPEARANCE UR: ABNORMAL
AST SERPL-CCNC: 14 U/L — SIGNIFICANT CHANGE UP (ref 0–41)
BACTERIA # UR AUTO: NEGATIVE /HPF — SIGNIFICANT CHANGE UP
BASOPHILS # BLD AUTO: 0.03 K/UL — SIGNIFICANT CHANGE UP (ref 0–0.2)
BASOPHILS NFR BLD AUTO: 0.2 % — SIGNIFICANT CHANGE UP (ref 0–1)
BILIRUB SERPL-MCNC: 0.8 MG/DL — SIGNIFICANT CHANGE UP (ref 0.2–1.2)
BILIRUB UR-MCNC: NEGATIVE — SIGNIFICANT CHANGE UP
BUN SERPL-MCNC: 45 MG/DL — HIGH (ref 10–20)
CALCIUM SERPL-MCNC: 9.9 MG/DL — SIGNIFICANT CHANGE UP (ref 8.4–10.4)
CAST: 0 /LPF — SIGNIFICANT CHANGE UP (ref 0–4)
CHLORIDE SERPL-SCNC: 103 MMOL/L — SIGNIFICANT CHANGE UP (ref 98–110)
CO2 SERPL-SCNC: 25 MMOL/L — SIGNIFICANT CHANGE UP (ref 17–32)
COLOR SPEC: ABNORMAL
CREAT SERPL-MCNC: 1.8 MG/DL — HIGH (ref 0.7–1.5)
DIFF PNL FLD: ABNORMAL
EGFR: 32 ML/MIN/1.73M2 — LOW
EGFR: 32 ML/MIN/1.73M2 — LOW
EOSINOPHIL # BLD AUTO: 0.12 K/UL — SIGNIFICANT CHANGE UP (ref 0–0.7)
EOSINOPHIL NFR BLD AUTO: 0.7 % — SIGNIFICANT CHANGE UP (ref 0–8)
GLUCOSE SERPL-MCNC: 155 MG/DL — HIGH (ref 70–99)
GLUCOSE UR QL: NEGATIVE MG/DL — SIGNIFICANT CHANGE UP
HCG SERPL QL: NEGATIVE — SIGNIFICANT CHANGE UP
HCT VFR BLD CALC: 41.1 % — SIGNIFICANT CHANGE UP (ref 37–47)
HGB BLD-MCNC: 13.7 G/DL — SIGNIFICANT CHANGE UP (ref 12–16)
IMM GRANULOCYTES NFR BLD AUTO: 0.6 % — HIGH (ref 0.1–0.3)
KETONES UR-MCNC: NEGATIVE MG/DL — SIGNIFICANT CHANGE UP
LEUKOCYTE ESTERASE UR-ACNC: ABNORMAL
LIDOCAIN IGE QN: 95 U/L — HIGH (ref 7–60)
LYMPHOCYTES # BLD AUTO: 1.73 K/UL — SIGNIFICANT CHANGE UP (ref 1.2–3.4)
LYMPHOCYTES # BLD AUTO: 9.9 % — LOW (ref 20.5–51.1)
MCHC RBC-ENTMCNC: 31.9 PG — HIGH (ref 27–31)
MCHC RBC-ENTMCNC: 33.3 G/DL — SIGNIFICANT CHANGE UP (ref 32–37)
MCV RBC AUTO: 95.6 FL — SIGNIFICANT CHANGE UP (ref 81–99)
MONOCYTES # BLD AUTO: 0.99 K/UL — HIGH (ref 0.1–0.6)
MONOCYTES NFR BLD AUTO: 5.7 % — SIGNIFICANT CHANGE UP (ref 1.7–9.3)
NEUTROPHILS # BLD AUTO: 14.52 K/UL — HIGH (ref 1.4–6.5)
NEUTROPHILS NFR BLD AUTO: 82.9 % — HIGH (ref 42.2–75.2)
NITRITE UR-MCNC: NEGATIVE — SIGNIFICANT CHANGE UP
NRBC # BLD: 0 /100 WBCS — SIGNIFICANT CHANGE UP (ref 0–0)
NRBC BLD-RTO: 0 /100 WBCS — SIGNIFICANT CHANGE UP (ref 0–0)
PH UR: 5.5 — SIGNIFICANT CHANGE UP (ref 5–8)
PLATELET # BLD AUTO: 242 K/UL — SIGNIFICANT CHANGE UP (ref 130–400)
PMV BLD: 11 FL — HIGH (ref 7.4–10.4)
POTASSIUM SERPL-MCNC: 4.9 MMOL/L — SIGNIFICANT CHANGE UP (ref 3.5–5)
POTASSIUM SERPL-SCNC: 4.9 MMOL/L — SIGNIFICANT CHANGE UP (ref 3.5–5)
PROT SERPL-MCNC: 5.7 G/DL — LOW (ref 6–8)
PROT UR-MCNC: 300 MG/DL
RBC # BLD: 4.3 M/UL — SIGNIFICANT CHANGE UP (ref 4.2–5.4)
RBC # FLD: 13.7 % — SIGNIFICANT CHANGE UP (ref 11.5–14.5)
RBC CASTS # UR COMP ASSIST: 85 /HPF — HIGH (ref 0–4)
SODIUM SERPL-SCNC: 137 MMOL/L — SIGNIFICANT CHANGE UP (ref 135–146)
SP GR SPEC: 1.02 — SIGNIFICANT CHANGE UP (ref 1–1.03)
SQUAMOUS # UR AUTO: 4 /HPF — SIGNIFICANT CHANGE UP (ref 0–5)
UROBILINOGEN FLD QL: 0.2 MG/DL — SIGNIFICANT CHANGE UP (ref 0.2–1)
WBC # BLD: 17.49 K/UL — HIGH (ref 4.8–10.8)
WBC # FLD AUTO: 17.49 K/UL — HIGH (ref 4.8–10.8)
WBC UR QL: 21 /HPF — HIGH (ref 0–5)

## 2024-10-12 PROCEDURE — 99285 EMERGENCY DEPT VISIT HI MDM: CPT

## 2024-10-12 PROCEDURE — 87077 CULTURE AEROBIC IDENTIFY: CPT

## 2024-10-12 PROCEDURE — 81001 URINALYSIS AUTO W/SCOPE: CPT

## 2024-10-12 PROCEDURE — 85025 COMPLETE CBC W/AUTO DIFF WBC: CPT

## 2024-10-12 PROCEDURE — 87086 URINE CULTURE/COLONY COUNT: CPT

## 2024-10-12 PROCEDURE — 74177 CT ABD & PELVIS W/CONTRAST: CPT | Mod: MC

## 2024-10-12 PROCEDURE — 36415 COLL VENOUS BLD VENIPUNCTURE: CPT

## 2024-10-12 PROCEDURE — 96374 THER/PROPH/DIAG INJ IV PUSH: CPT | Mod: XU

## 2024-10-12 PROCEDURE — 83690 ASSAY OF LIPASE: CPT

## 2024-10-12 PROCEDURE — 80053 COMPREHEN METABOLIC PANEL: CPT

## 2024-10-12 PROCEDURE — 84703 CHORIONIC GONADOTROPIN ASSAY: CPT

## 2024-10-12 PROCEDURE — 99284 EMERGENCY DEPT VISIT MOD MDM: CPT | Mod: 25

## 2024-10-12 PROCEDURE — 74177 CT ABD & PELVIS W/CONTRAST: CPT | Mod: 26,MC

## 2024-10-12 PROCEDURE — 87186 SC STD MICRODIL/AGAR DIL: CPT

## 2024-10-12 RX ORDER — CIPROFLOXACIN HCL 250 MG
1 TABLET ORAL
Qty: 20 | Refills: 0
Start: 2024-10-12 | End: 2024-10-21

## 2024-10-12 RX ORDER — SODIUM CHLORIDE 9 G/1000ML
1000 INJECTION, SOLUTION INTRAVENOUS ONCE
Refills: 0 | Status: COMPLETED | OUTPATIENT
Start: 2024-10-12 | End: 2024-10-12

## 2024-10-12 RX ORDER — METRONIDAZOLE 250 MG
1 TABLET ORAL
Qty: 20 | Refills: 0
Start: 2024-10-12 | End: 2024-10-21

## 2024-10-12 RX ADMIN — Medication 8 MILLIGRAM(S): at 10:00

## 2024-10-12 RX ADMIN — SODIUM CHLORIDE 1000 MILLILITER(S): 9 INJECTION, SOLUTION INTRAVENOUS at 10:00

## 2024-10-15 ENCOUNTER — INPATIENT (INPATIENT)
Facility: HOSPITAL | Age: 59
LOS: 2 days | Discharge: HOME | DRG: 684 | End: 2024-10-18
Attending: INTERNAL MEDICINE | Admitting: STUDENT IN AN ORGANIZED HEALTH CARE EDUCATION/TRAINING PROGRAM
Payer: COMMERCIAL

## 2024-10-15 VITALS
TEMPERATURE: 98 F | RESPIRATION RATE: 20 BRPM | SYSTOLIC BLOOD PRESSURE: 188 MMHG | DIASTOLIC BLOOD PRESSURE: 97 MMHG | HEIGHT: 66 IN | WEIGHT: 175.05 LBS | HEART RATE: 90 BPM | OXYGEN SATURATION: 96 %

## 2024-10-15 PROCEDURE — 99285 EMERGENCY DEPT VISIT HI MDM: CPT

## 2024-10-15 RX ORDER — SODIUM CHLORIDE IRRIG SOLUTION 0.9 %
2000 SOLUTION, IRRIGATION IRRIGATION ONCE
Refills: 0 | Status: COMPLETED | OUTPATIENT
Start: 2024-10-15 | End: 2024-10-15

## 2024-10-15 RX ADMIN — Medication 2000 MILLILITER(S): at 23:24

## 2024-10-15 NOTE — ED ADULT TRIAGE NOTE - CHIEF COMPLAINT QUOTE
Pt. sent into ED by PCP due to abnormal lab work. As per pt. PCP reports creatinine levels of 3.1. Pt. also reports of lower abdominal pain radiating to lower back

## 2024-10-16 DIAGNOSIS — N17.9 ACUTE KIDNEY FAILURE, UNSPECIFIED: ICD-10-CM

## 2024-10-16 LAB
-  AMOXICILLIN/CLAVULANIC ACID: SIGNIFICANT CHANGE UP
-  AMPICILLIN/SULBACTAM: SIGNIFICANT CHANGE UP
-  AMPICILLIN: SIGNIFICANT CHANGE UP
-  AZTREONAM: SIGNIFICANT CHANGE UP
-  CEFAZOLIN: SIGNIFICANT CHANGE UP
-  CEFEPIME: SIGNIFICANT CHANGE UP
-  CEFOXITIN: SIGNIFICANT CHANGE UP
-  CEFTRIAXONE: SIGNIFICANT CHANGE UP
-  CIPROFLOXACIN: SIGNIFICANT CHANGE UP
-  ERTAPENEM: SIGNIFICANT CHANGE UP
-  GENTAMICIN: SIGNIFICANT CHANGE UP
-  IMIPENEM: SIGNIFICANT CHANGE UP
-  LEVOFLOXACIN: SIGNIFICANT CHANGE UP
-  MEROPENEM: SIGNIFICANT CHANGE UP
-  NITROFURANTOIN: SIGNIFICANT CHANGE UP
-  PIPERACILLIN/TAZOBACTAM: SIGNIFICANT CHANGE UP
-  TOBRAMYCIN: SIGNIFICANT CHANGE UP
-  TRIMETHOPRIM/SULFAMETHOXAZOLE: SIGNIFICANT CHANGE UP
A1C WITH ESTIMATED AVERAGE GLUCOSE RESULT: 6.5 % — HIGH (ref 4–5.6)
ALBUMIN SERPL ELPH-MCNC: 3.2 G/DL — LOW (ref 3.5–5.2)
ALBUMIN SERPL ELPH-MCNC: 3.8 G/DL — SIGNIFICANT CHANGE UP (ref 3.5–5.2)
ALP SERPL-CCNC: 45 U/L — SIGNIFICANT CHANGE UP (ref 30–115)
ALP SERPL-CCNC: 63 U/L — SIGNIFICANT CHANGE UP (ref 30–115)
ALT FLD-CCNC: 15 U/L — SIGNIFICANT CHANGE UP (ref 0–41)
ALT FLD-CCNC: 19 U/L — SIGNIFICANT CHANGE UP (ref 0–41)
AMYLASE P1 CFR SERPL: 84 U/L — SIGNIFICANT CHANGE UP (ref 25–115)
ANION GAP SERPL CALC-SCNC: 13 MMOL/L — SIGNIFICANT CHANGE UP (ref 7–14)
ANION GAP SERPL CALC-SCNC: 14 MMOL/L — SIGNIFICANT CHANGE UP (ref 7–14)
APPEARANCE UR: CLEAR — SIGNIFICANT CHANGE UP
AST SERPL-CCNC: 20 U/L — SIGNIFICANT CHANGE UP (ref 0–41)
AST SERPL-CCNC: 26 U/L — SIGNIFICANT CHANGE UP (ref 0–41)
BACTERIA # UR AUTO: NEGATIVE /HPF — SIGNIFICANT CHANGE UP
BASOPHILS # BLD AUTO: 0.03 K/UL — SIGNIFICANT CHANGE UP (ref 0–0.2)
BASOPHILS # BLD AUTO: 0.04 K/UL — SIGNIFICANT CHANGE UP (ref 0–0.2)
BASOPHILS NFR BLD AUTO: 0.5 % — SIGNIFICANT CHANGE UP (ref 0–1)
BASOPHILS NFR BLD AUTO: 0.5 % — SIGNIFICANT CHANGE UP (ref 0–1)
BILIRUB SERPL-MCNC: 0.4 MG/DL — SIGNIFICANT CHANGE UP (ref 0.2–1.2)
BILIRUB SERPL-MCNC: <0.2 MG/DL — SIGNIFICANT CHANGE UP (ref 0.2–1.2)
BILIRUB UR-MCNC: NEGATIVE — SIGNIFICANT CHANGE UP
BUN SERPL-MCNC: 51 MG/DL — HIGH (ref 10–20)
BUN SERPL-MCNC: 61 MG/DL — CRITICAL HIGH (ref 10–20)
CALCIUM SERPL-MCNC: 9 MG/DL — SIGNIFICANT CHANGE UP (ref 8.4–10.5)
CALCIUM SERPL-MCNC: 9.2 MG/DL — SIGNIFICANT CHANGE UP (ref 8.4–10.5)
CAST: 0 /LPF — SIGNIFICANT CHANGE UP (ref 0–4)
CHLORIDE SERPL-SCNC: 106 MMOL/L — SIGNIFICANT CHANGE UP (ref 98–110)
CHLORIDE SERPL-SCNC: 106 MMOL/L — SIGNIFICANT CHANGE UP (ref 98–110)
CHOLEST SERPL-MCNC: 181 MG/DL — SIGNIFICANT CHANGE UP
CK SERPL-CCNC: 121 U/L — SIGNIFICANT CHANGE UP (ref 0–225)
CO2 SERPL-SCNC: 21 MMOL/L — SIGNIFICANT CHANGE UP (ref 17–32)
CO2 SERPL-SCNC: 22 MMOL/L — SIGNIFICANT CHANGE UP (ref 17–32)
COLOR SPEC: YELLOW — SIGNIFICANT CHANGE UP
CREAT SERPL-MCNC: 2.4 MG/DL — HIGH (ref 0.7–1.5)
CREAT SERPL-MCNC: 3 MG/DL — HIGH (ref 0.7–1.5)
CULTURE RESULTS: ABNORMAL
DIFF PNL FLD: ABNORMAL
EGFR: 17 ML/MIN/1.73M2 — LOW
EGFR: 22 ML/MIN/1.73M2 — LOW
EOSINOPHIL # BLD AUTO: 0.12 K/UL — SIGNIFICANT CHANGE UP (ref 0–0.7)
EOSINOPHIL # BLD AUTO: 0.18 K/UL — SIGNIFICANT CHANGE UP (ref 0–0.7)
EOSINOPHIL NFR BLD AUTO: 2.1 % — SIGNIFICANT CHANGE UP (ref 0–8)
EOSINOPHIL NFR BLD AUTO: 2.2 % — SIGNIFICANT CHANGE UP (ref 0–8)
ESTIMATED AVERAGE GLUCOSE: 140 MG/DL — HIGH (ref 68–114)
GLUCOSE SERPL-MCNC: 105 MG/DL — HIGH (ref 70–99)
GLUCOSE SERPL-MCNC: 144 MG/DL — HIGH (ref 70–99)
GLUCOSE UR QL: NEGATIVE MG/DL — SIGNIFICANT CHANGE UP
HCT VFR BLD CALC: 34.5 % — LOW (ref 37–47)
HCT VFR BLD CALC: 37 % — SIGNIFICANT CHANGE UP (ref 37–47)
HDLC SERPL-MCNC: 38 MG/DL — LOW
HGB BLD-MCNC: 11.6 G/DL — LOW (ref 12–16)
HGB BLD-MCNC: 12.6 G/DL — SIGNIFICANT CHANGE UP (ref 12–16)
IMM GRANULOCYTES NFR BLD AUTO: 0.5 % — HIGH (ref 0.1–0.3)
IMM GRANULOCYTES NFR BLD AUTO: 0.6 % — HIGH (ref 0.1–0.3)
KETONES UR-MCNC: NEGATIVE MG/DL — SIGNIFICANT CHANGE UP
LEUKOCYTE ESTERASE UR-ACNC: ABNORMAL
LIDOCAIN IGE QN: 94 U/L — HIGH (ref 7–60)
LIPID PNL WITH DIRECT LDL SERPL: 116 MG/DL — HIGH
LYMPHOCYTES # BLD AUTO: 1.8 K/UL — SIGNIFICANT CHANGE UP (ref 1.2–3.4)
LYMPHOCYTES # BLD AUTO: 1.94 K/UL — SIGNIFICANT CHANGE UP (ref 1.2–3.4)
LYMPHOCYTES # BLD AUTO: 23.8 % — SIGNIFICANT CHANGE UP (ref 20.5–51.1)
LYMPHOCYTES # BLD AUTO: 31.7 % — SIGNIFICANT CHANGE UP (ref 20.5–51.1)
MCHC RBC-ENTMCNC: 31.7 PG — HIGH (ref 27–31)
MCHC RBC-ENTMCNC: 32 PG — HIGH (ref 27–31)
MCHC RBC-ENTMCNC: 33.6 G/DL — SIGNIFICANT CHANGE UP (ref 32–37)
MCHC RBC-ENTMCNC: 34.1 G/DL — SIGNIFICANT CHANGE UP (ref 32–37)
MCV RBC AUTO: 93.9 FL — SIGNIFICANT CHANGE UP (ref 81–99)
MCV RBC AUTO: 94.3 FL — SIGNIFICANT CHANGE UP (ref 81–99)
METHOD TYPE: SIGNIFICANT CHANGE UP
MONOCYTES # BLD AUTO: 0.72 K/UL — HIGH (ref 0.1–0.6)
MONOCYTES # BLD AUTO: 0.86 K/UL — HIGH (ref 0.1–0.6)
MONOCYTES NFR BLD AUTO: 10.6 % — HIGH (ref 1.7–9.3)
MONOCYTES NFR BLD AUTO: 12.7 % — HIGH (ref 1.7–9.3)
NEUTROPHILS # BLD AUTO: 2.97 K/UL — SIGNIFICANT CHANGE UP (ref 1.4–6.5)
NEUTROPHILS # BLD AUTO: 5.08 K/UL — SIGNIFICANT CHANGE UP (ref 1.4–6.5)
NEUTROPHILS NFR BLD AUTO: 52.5 % — SIGNIFICANT CHANGE UP (ref 42.2–75.2)
NEUTROPHILS NFR BLD AUTO: 62.3 % — SIGNIFICANT CHANGE UP (ref 42.2–75.2)
NITRITE UR-MCNC: NEGATIVE — SIGNIFICANT CHANGE UP
NON HDL CHOLESTEROL: 143 MG/DL — HIGH
NRBC # BLD: 0 /100 WBCS — SIGNIFICANT CHANGE UP (ref 0–0)
NRBC # BLD: 0 /100 WBCS — SIGNIFICANT CHANGE UP (ref 0–0)
ORGANISM # SPEC MICROSCOPIC CNT: ABNORMAL
ORGANISM # SPEC MICROSCOPIC CNT: SIGNIFICANT CHANGE UP
PH UR: 5.5 — SIGNIFICANT CHANGE UP (ref 5–8)
PLATELET # BLD AUTO: 224 K/UL — SIGNIFICANT CHANGE UP (ref 130–400)
PLATELET # BLD AUTO: 254 K/UL — SIGNIFICANT CHANGE UP (ref 130–400)
PMV BLD: 10.7 FL — HIGH (ref 7.4–10.4)
PMV BLD: 10.9 FL — HIGH (ref 7.4–10.4)
POTASSIUM SERPL-MCNC: 4.4 MMOL/L — SIGNIFICANT CHANGE UP (ref 3.5–5)
POTASSIUM SERPL-MCNC: 5 MMOL/L — SIGNIFICANT CHANGE UP (ref 3.5–5)
POTASSIUM SERPL-SCNC: 4.4 MMOL/L — SIGNIFICANT CHANGE UP (ref 3.5–5)
POTASSIUM SERPL-SCNC: 5 MMOL/L — SIGNIFICANT CHANGE UP (ref 3.5–5)
PROT SERPL-MCNC: 5 G/DL — LOW (ref 6–8)
PROT SERPL-MCNC: 5.9 G/DL — LOW (ref 6–8)
PROT UR-MCNC: 100 MG/DL
RBC # BLD: 3.66 M/UL — LOW (ref 4.2–5.4)
RBC # BLD: 3.94 M/UL — LOW (ref 4.2–5.4)
RBC # FLD: 13.4 % — SIGNIFICANT CHANGE UP (ref 11.5–14.5)
RBC # FLD: 13.4 % — SIGNIFICANT CHANGE UP (ref 11.5–14.5)
RBC CASTS # UR COMP ASSIST: 73 /HPF — HIGH (ref 0–4)
SODIUM SERPL-SCNC: 140 MMOL/L — SIGNIFICANT CHANGE UP (ref 135–146)
SODIUM SERPL-SCNC: 142 MMOL/L — SIGNIFICANT CHANGE UP (ref 135–146)
SP GR SPEC: 1.01 — SIGNIFICANT CHANGE UP (ref 1–1.03)
SPECIMEN SOURCE: SIGNIFICANT CHANGE UP
SQUAMOUS # UR AUTO: 1 /HPF — SIGNIFICANT CHANGE UP (ref 0–5)
TRIGL SERPL-MCNC: 137 MG/DL — SIGNIFICANT CHANGE UP
UROBILINOGEN FLD QL: 0.2 MG/DL — SIGNIFICANT CHANGE UP (ref 0.2–1)
WBC # BLD: 5.67 K/UL — SIGNIFICANT CHANGE UP (ref 4.8–10.8)
WBC # BLD: 8.15 K/UL — SIGNIFICANT CHANGE UP (ref 4.8–10.8)
WBC # FLD AUTO: 5.67 K/UL — SIGNIFICANT CHANGE UP (ref 4.8–10.8)
WBC # FLD AUTO: 8.15 K/UL — SIGNIFICANT CHANGE UP (ref 4.8–10.8)
WBC UR QL: 7 /HPF — HIGH (ref 0–5)

## 2024-10-16 PROCEDURE — 83735 ASSAY OF MAGNESIUM: CPT

## 2024-10-16 PROCEDURE — 36415 COLL VENOUS BLD VENIPUNCTURE: CPT

## 2024-10-16 PROCEDURE — 80048 BASIC METABOLIC PNL TOTAL CA: CPT

## 2024-10-16 PROCEDURE — 80061 LIPID PANEL: CPT

## 2024-10-16 PROCEDURE — 83036 HEMOGLOBIN GLYCOSYLATED A1C: CPT

## 2024-10-16 PROCEDURE — 76770 US EXAM ABDO BACK WALL COMP: CPT

## 2024-10-16 PROCEDURE — 83690 ASSAY OF LIPASE: CPT

## 2024-10-16 PROCEDURE — 99221 1ST HOSP IP/OBS SF/LOW 40: CPT

## 2024-10-16 PROCEDURE — 80053 COMPREHEN METABOLIC PANEL: CPT

## 2024-10-16 PROCEDURE — 82962 GLUCOSE BLOOD TEST: CPT

## 2024-10-16 PROCEDURE — 85025 COMPLETE CBC W/AUTO DIFF WBC: CPT

## 2024-10-16 PROCEDURE — 76770 US EXAM ABDO BACK WALL COMP: CPT | Mod: 26

## 2024-10-16 PROCEDURE — 82150 ASSAY OF AMYLASE: CPT

## 2024-10-16 RX ORDER — ALCOHOL ANTISEPTIC PADS
25 PADS, MEDICATED (EA) TOPICAL ONCE
Refills: 0 | Status: DISCONTINUED | OUTPATIENT
Start: 2024-10-16 | End: 2024-10-18

## 2024-10-16 RX ORDER — SODIUM CHLORIDE IRRIG SOLUTION 0.9 %
1000 SOLUTION, IRRIGATION IRRIGATION
Refills: 0 | Status: DISCONTINUED | OUTPATIENT
Start: 2024-10-16 | End: 2024-10-18

## 2024-10-16 RX ORDER — ALCOHOL ANTISEPTIC PADS
15 PADS, MEDICATED (EA) TOPICAL ONCE
Refills: 0 | Status: DISCONTINUED | OUTPATIENT
Start: 2024-10-16 | End: 2024-10-18

## 2024-10-16 RX ORDER — 5-HYDROXYTRYPTOPHAN (5-HTP) 100 MG
3 TABLET,DISINTEGRATING ORAL AT BEDTIME
Refills: 0 | Status: DISCONTINUED | OUTPATIENT
Start: 2024-10-16 | End: 2024-10-18

## 2024-10-16 RX ORDER — ASPIRIN 325 MG
0 TABLET ORAL
Refills: 0 | DISCHARGE

## 2024-10-16 RX ORDER — ALCOHOL ANTISEPTIC PADS
12.5 PADS, MEDICATED (EA) TOPICAL ONCE
Refills: 0 | Status: DISCONTINUED | OUTPATIENT
Start: 2024-10-16 | End: 2024-10-18

## 2024-10-16 RX ORDER — GLIPIZIDE 5 MG/1
1 TABLET ORAL
Refills: 0 | DISCHARGE

## 2024-10-16 RX ORDER — INSULIN LISPRO 100/ML
VIAL (ML) SUBCUTANEOUS
Refills: 0 | Status: DISCONTINUED | OUTPATIENT
Start: 2024-10-16 | End: 2024-10-18

## 2024-10-16 RX ORDER — CEFTRIAXONE SODIUM 1 G
1000 VIAL (EA) INJECTION EVERY 24 HOURS
Refills: 0 | Status: COMPLETED | OUTPATIENT
Start: 2024-10-16 | End: 2024-10-18

## 2024-10-16 RX ORDER — SITAGLIPTIN PHOSPHATE 100 MG
1 TABLET ORAL
Refills: 0 | DISCHARGE

## 2024-10-16 RX ORDER — MAG HYDROX/ALUMINUM HYD/SIMETH 200-200-20
30 SUSPENSION, ORAL (FINAL DOSE FORM) ORAL EVERY 4 HOURS
Refills: 0 | Status: DISCONTINUED | OUTPATIENT
Start: 2024-10-16 | End: 2024-10-18

## 2024-10-16 RX ORDER — ROSUVASTATIN CALCIUM 20 MG/1
10 TABLET, COATED ORAL AT BEDTIME
Refills: 0 | Status: DISCONTINUED | OUTPATIENT
Start: 2024-10-16 | End: 2024-10-18

## 2024-10-16 RX ORDER — GLUCAGON INJECTION, SOLUTION 0.5 MG/.1ML
1 INJECTION, SOLUTION SUBCUTANEOUS ONCE
Refills: 0 | Status: DISCONTINUED | OUTPATIENT
Start: 2024-10-16 | End: 2024-10-18

## 2024-10-16 RX ORDER — ONDANSETRON HCL/PF 4 MG/2 ML
4 VIAL (ML) INJECTION EVERY 8 HOURS
Refills: 0 | Status: DISCONTINUED | OUTPATIENT
Start: 2024-10-16 | End: 2024-10-18

## 2024-10-16 RX ORDER — METOPROLOL TARTRATE 50 MG
1 TABLET ORAL
Refills: 0 | DISCHARGE

## 2024-10-16 RX ORDER — ACETAMINOPHEN 325 MG
650 TABLET ORAL EVERY 6 HOURS
Refills: 0 | Status: DISCONTINUED | OUTPATIENT
Start: 2024-10-16 | End: 2024-10-18

## 2024-10-16 RX ORDER — METOPROLOL TARTRATE 50 MG
100 TABLET ORAL DAILY
Refills: 0 | Status: DISCONTINUED | OUTPATIENT
Start: 2024-10-16 | End: 2024-10-18

## 2024-10-16 RX ORDER — ASPIRIN 325 MG
81 TABLET ORAL DAILY
Refills: 0 | Status: DISCONTINUED | OUTPATIENT
Start: 2024-10-16 | End: 2024-10-18

## 2024-10-16 RX ORDER — ROSUVASTATIN CALCIUM 20 MG/1
1 TABLET, COATED ORAL
Refills: 0 | DISCHARGE

## 2024-10-16 RX ADMIN — Medication 500 MILLIGRAM(S): at 05:39

## 2024-10-16 RX ADMIN — Medication 75 MILLILITER(S): at 03:48

## 2024-10-16 RX ADMIN — Medication 100 MILLIGRAM(S): at 05:39

## 2024-10-16 RX ADMIN — Medication 5000 UNIT(S): at 05:39

## 2024-10-16 RX ADMIN — Medication 5000 UNIT(S): at 22:06

## 2024-10-16 RX ADMIN — ROSUVASTATIN CALCIUM 10 MILLIGRAM(S): 20 TABLET, COATED ORAL at 22:06

## 2024-10-16 RX ADMIN — Medication 81 MILLIGRAM(S): at 15:46

## 2024-10-16 RX ADMIN — Medication 75 MILLILITER(S): at 23:49

## 2024-10-16 RX ADMIN — Medication 5000 UNIT(S): at 15:14

## 2024-10-16 RX ADMIN — Medication 500 MILLIGRAM(S): at 15:14

## 2024-10-16 RX ADMIN — Medication 500 MILLIGRAM(S): at 22:06

## 2024-10-16 NOTE — H&P ADULT - ATTENDING COMMENTS
Pt has a pmhx of as above who is here because of elevated creatinine as found by her PMD. She had diverticulitis on the 12th and a positive UTI and was discharged with cipro and flagyl. It grew citrobacter. She is admitted for RUSSELL and UTI.     Vitals: HD stable, O2 stable  Gen: appropriate affect, no acute distress  Neuro: no focal defects, no sensory deficits  HEENT: EOMI, no JVD, normocephalic, atraumatic, no scleral icterus  Cardio: RRR, S1 S2 present, no murmurs, rubs, or gallops  Resp: lungs b/l CTA, chest wall intact  Abd: soft, nondistended, nontender  MSK: no gross joint abnormalities, no obvious swelling  Skin: no rashes or wounds  Heme: no ecchymosis or petechiae present    pain control  DVT ppx  GI ppx  diet  Code status  DC planning     I personally reviewed labs and imaging and ordered necessary testing/medications  I discussed care of the patient with licensed providers  I personally reviewed chart and consultant recommendations  Pt has complex medical issues that require extensive diagnosis and intervention / threat to life  I personally spent X minutes in care of patient. Pt has a pmhx of as above who is here because of elevated creatinine as found by her PMD. She had diverticulitis on the 12th and a positive UTI and was discharged with cipro and flagyl. It grew citrobacter. She is admitted for RUSSELL and UTI.     Vitals: HD stable, O2 stable  Gen: appropriate affect, no acute distress  Neuro: no focal defects, no sensory deficits  HEENT: EOMI, no JVD, normocephalic, atraumatic, no scleral icterus  Cardio: RRR, S1 S2 present, no murmurs, rubs, or gallops  Resp: lungs b/l CTA, chest wall intact  Abd: soft, nondistended, nontender  MSK: no gross joint abnormalities, no obvious swelling  Skin: no rashes or wounds  Heme: no ecchymosis or petechiae present    UTI  -cultures from the 12th grew citrobacter sensitive to ceftriaxone, will continue   -repeat cultures sent but I suspect that citrobacter will still be sensitive to citrobacter  -pt has been on cipro     RUSSELL  -Cr on the 12th was 1.8 -> on outpatient follow up the Cr was 3.1   -suspect prerenal in the setting of vasodilatory effects from infection (UTI and diverticulitis).   -here it is 3.0 down trending to 2.4   -continue LR at 75 cc per hour  -hold home enalapril at this time    hx of cva  -aspirin, statin    HTN  -can continue HCTZ  -metoprolol     acute diverticulitis   -pt has no abdominal pain at this time, continue flagyl for full 7 day course (abx started on 10/12)    diabetes hx  -pt with a hgb of 6.5  -hold on metformin at this time as pt has RUSSELL  -on januvia and glipizide at home  -continue ISS    DVT ppx - heparin subq  DC planning - remain admitted, monitor Cr levels     I personally reviewed labs and imaging and ordered necessary testing/medications  I discussed care of the patient with licensed providers  I personally reviewed chart and consultant recommendations  Pt has complex medical issues that require extensive diagnosis and intervention  I personally spent 40 minutes in care of patient.

## 2024-10-16 NOTE — H&P ADULT - ASSESSMENT
iv ciproflox   - F/U Ucx with sensitivites  - RBUS for stones  -     RUSSELL   CKD stage 3b    #HTN  #HLD  #DM      60 yo F w/ hx of CVA ?15 yrs ago without any residual deficits. urine stones, HTN, HLD, DM, essential tremor referred to the ED by her PMD for elevated creat on routine testing. CT Abdomen and Pelvis w/ IV Cont from 10/12 showed : Acute sigmoid diverticulitis. No bowel obstruction or drainable collection. Indeterminate right renal hypodense lesion. Stable (0.7 cm pancreatic head hypodense lesion (was 0.4cm in 1/12/24)) pancreatic head hypodense lesion may represent a cyst. No definite duct dilatation. Labs: WBC wnl, BUN/Cr 61/3, UA with trace LAE compared to 10/12, Ucx from 10/12 growing citrobacter koseri.     #RUSSELL on CKD   - Likely prerenal but could be 2/2 cipro as well   - IV gentle hydration   - holding enalapril    #UTI   #Citrobacter koseri  - IV rocephin   - F/U Ucx with sensitivites  - RBUS for stones    #Acute diverticulitis but asymptomatic  - IV flagyl     #HTN  #HLD  #DM  - C/W home meds   - holding enalapril  - FS & SSi    #Stable (0.7 cm pancreatic head hypodense lesion (was 0.4cm in 1/12/24)) pancreatic head hypodense lesion  - Per pt she had an MRI done in the previous year, consulted GI for the same and was told it was non concerning.   - Asked pt to bring records.     #Active smoker   - Counseled on cessation    #MISC  - Diet - DASH  - DVT - heparin  - GI prophy - na  - Activity - AAT

## 2024-10-16 NOTE — PATIENT PROFILE ADULT - FALL HARM RISK - HARM RISK INTERVENTIONS

## 2024-10-16 NOTE — H&P ADULT - NSHPPHYSICALEXAM_GEN_ALL_CORE
GENERAL: NAD  HEART: Regular rate and rhythm, no murmurs, rubs, or gallops  LUNGS: Unlabored respirations.  Clear to auscultation bilaterally, no crackles, wheezing, or rhonchi  ABDOMEN: Soft, nontender, nondistended, +BS  EXTREMITIES: 2+ peripheral pulses bilaterally. No clubbing, cyanosis, or edema  NERVOUS SYSTEM:  A&Ox3, moving all extremities, no focal deficits

## 2024-10-16 NOTE — ED PROVIDER NOTE - PHYSICAL EXAMINATION
CONSTITUTIONAL: Well-developed; well-nourished; in no acute distress.   SKIN: warm, dry  HEAD: Normocephalic; atraumatic.  EYES: PERRL, EOMI, no conjunctival erythema  ENT: No nasal discharge; airway clear.  NECK: Supple; non tender.  CARD: S1, S2 normal; no murmurs, gallops, or rubs. Regular rate and rhythm.   RESP: No wheezes, rales or rhonchi.  ABD: soft nd, mild tenderness to the LLQ. no cva tenderness.   EXT: Normal ROM.  No clubbing, cyanosis or edema.   NEURO: Alert, oriented, grossly unremarkable.  PSYCH: Cooperative, appropriate.

## 2024-10-16 NOTE — ED ADULT NURSE NOTE - NSFALLUNIVINTERV_ED_ALL_ED
Bed/Stretcher in lowest position, wheels locked, appropriate side rails in place/Call bell, personal items and telephone in reach/Instruct patient to call for assistance before getting out of bed/chair/stretcher/Non-slip footwear applied when patient is off stretcher/[place] to call system/Physically safe environment - no spills, clutter or unnecessary equipment/Purposeful proactive rounding/Room/bathroom lighting operational, light cord in reach

## 2024-10-16 NOTE — ED PROVIDER NOTE - CLINICAL SUMMARY MEDICAL DECISION MAKING FREE TEXT BOX
60 yo F w/ hx of HTN, HLD, DM, essential tremor referred to admission for RUSSELL, creatinine increased from 1.8 -> 3. BUN/creatinine >20. rpt labs same as outpatient ones. wbc improved from previous ED visit where she was diagnosed with diverticulitis. given iv fluids. admitted for further work up.

## 2024-10-16 NOTE — ED PROVIDER NOTE - OBJECTIVE STATEMENT
58 yo F w/ hx of HTN, HLD, DM, essential tremor referred to admission for RUSSELL. recently diagnosed with diverticulitis and UTI, on ciprofloxacin/ flagyl, had repeat labs with her PCP and was called back for RUSSELL with creatinine 3.1 endorses dark urine. no fevers. abd pain improving from last visit. no vomiting/diarrhea, no dysuria, no rashes. denies flank pain.

## 2024-10-16 NOTE — H&P ADULT - HISTORY OF PRESENT ILLNESS
58 yo F w/ hx of HTN, HLD, DM, essential tremor referred to admission for RUSSELL. recently diagnosed with diverticulitis and UTI, on ciprofloxacin/ flagyl, had repeat labs with her PCP and was called back for RUSSELL with creatinine 3.1 endorses dark urine. no fevers. abd pain improving from last visit. no vomiting/diarrhea, no dysuria, no rashes. denies flank pain.  Pt. sent into ED by PCP due to abnormal lab work. As per pt. PCP reports creatinine levels of 3.1. Pt. also reports of lower abdominal pain radiating to lower back · Temp at ED Arrival (C)	36.8 Degrees C · O2 Delivery/Oxygen Delivery Method	room air · SpO2 (%)	96 % · Temp site	oral · Temp (C)	36.8 Degrees C · Temp (F)	98.3 Degrees F · Respiration Rate (breaths/min)	20 /min · Heart Rate	90 /min · BP Diastolic	  97 mm Hg · BP Systolic	  188 mm Hg  CT Abdomen and Pelvis w/ IV Cont from 10/12 showed : Acute sigmoid diverticulitis. No bowel obstruction or drainable collection. Indeterminate right renal hypodense lesion. Recommend outpatient MRI renal with and without contrast for further evaluation.  Stable (0.7 cm pancreatic head hypodense lesion (was 0.4cm in 1/12/24)) pancreatic head hypodense lesion may represent a cyst. No definite duct dilatation. This can also be evaluated with MRI with MRCP.  Labs: WBC wnl, BUN/Cr 61/3, UA with trace LAE compared to 10/12, Ucx from 10/12 growing citrobacter koseri.   Patient is a 58 yo F w/ hx of CVA ?15 yrs ago without any residual deficits. urine stones, HTN, HLD, DM, essential tremor referred to the ED by her PMD for elevated creat on routine testing. She recently was diagnosed with diverticulitis and UTI, & DCed on PO ciprofloxacin/ flagyl, which had improved her urinary symptoms. She currently has no symptoms including any dysuria, diarrhea or abdominal pain. Apart from this pt has been an active smoker almost all her life and continues to smoke, and sees a pain specialist for sciatica   · Temp at ED Arrival (C)	36.8 Degrees C · O2 Delivery/Oxygen Delivery Method	room air · SpO2 (%)	96 % · Temp site	oral · Temp (C)	36.8 Degrees C · Temp (F)	98.3 Degrees F · Respiration Rate (breaths/min)	20 /min · Heart Rate	90 /min · BP Diastolic	  97 mm Hg · BP Systolic	  188 mm Hg  CT Abdomen and Pelvis w/ IV Cont from 10/12 showed : Acute sigmoid diverticulitis. No bowel obstruction or drainable collection. Indeterminate right renal hypodense lesion. Recommend outpatient MRI renal with and without contrast for further evaluation.  Stable (0.7 cm pancreatic head hypodense lesion (was 0.4cm in 1/12/24)) pancreatic head hypodense lesion may represent a cyst. No definite duct dilatation. This can also be evaluated with MRI with MRCP.  Labs: WBC wnl, BUN/Cr 61/3, UA with trace LAE compared to 10/12, Ucx from 10/12 growing citrobacter koseri.

## 2024-10-17 LAB
ALBUMIN SERPL ELPH-MCNC: 3.3 G/DL — LOW (ref 3.5–5.2)
ALP SERPL-CCNC: 49 U/L — SIGNIFICANT CHANGE UP (ref 30–115)
ALT FLD-CCNC: 18 U/L — SIGNIFICANT CHANGE UP (ref 0–41)
ANION GAP SERPL CALC-SCNC: 16 MMOL/L — HIGH (ref 7–14)
ANION GAP SERPL CALC-SCNC: 9 MMOL/L — SIGNIFICANT CHANGE UP (ref 7–14)
AST SERPL-CCNC: 21 U/L — SIGNIFICANT CHANGE UP (ref 0–41)
BASOPHILS # BLD AUTO: 0.03 K/UL — SIGNIFICANT CHANGE UP (ref 0–0.2)
BASOPHILS NFR BLD AUTO: 0.6 % — SIGNIFICANT CHANGE UP (ref 0–1)
BILIRUB SERPL-MCNC: 0.3 MG/DL — SIGNIFICANT CHANGE UP (ref 0.2–1.2)
BUN SERPL-MCNC: 38 MG/DL — HIGH (ref 10–20)
BUN SERPL-MCNC: 40 MG/DL — HIGH (ref 10–20)
CALCIUM SERPL-MCNC: 9.2 MG/DL — SIGNIFICANT CHANGE UP (ref 8.4–10.5)
CALCIUM SERPL-MCNC: 9.4 MG/DL — SIGNIFICANT CHANGE UP (ref 8.4–10.5)
CHLORIDE SERPL-SCNC: 102 MMOL/L — SIGNIFICANT CHANGE UP (ref 98–110)
CHLORIDE SERPL-SCNC: 107 MMOL/L — SIGNIFICANT CHANGE UP (ref 98–110)
CO2 SERPL-SCNC: 21 MMOL/L — SIGNIFICANT CHANGE UP (ref 17–32)
CO2 SERPL-SCNC: 24 MMOL/L — SIGNIFICANT CHANGE UP (ref 17–32)
CREAT SERPL-MCNC: 2 MG/DL — HIGH (ref 0.7–1.5)
CREAT SERPL-MCNC: 2.1 MG/DL — HIGH (ref 0.7–1.5)
EGFR: 26 ML/MIN/1.73M2 — LOW
EGFR: 28 ML/MIN/1.73M2 — LOW
EOSINOPHIL # BLD AUTO: 0.16 K/UL — SIGNIFICANT CHANGE UP (ref 0–0.7)
EOSINOPHIL NFR BLD AUTO: 3.2 % — SIGNIFICANT CHANGE UP (ref 0–8)
GLUCOSE BLDC GLUCOMTR-MCNC: 115 MG/DL — HIGH (ref 70–99)
GLUCOSE BLDC GLUCOMTR-MCNC: 141 MG/DL — HIGH (ref 70–99)
GLUCOSE BLDC GLUCOMTR-MCNC: 156 MG/DL — HIGH (ref 70–99)
GLUCOSE SERPL-MCNC: 116 MG/DL — HIGH (ref 70–99)
GLUCOSE SERPL-MCNC: 139 MG/DL — HIGH (ref 70–99)
HCT VFR BLD CALC: 35 % — LOW (ref 37–47)
HGB BLD-MCNC: 11.8 G/DL — LOW (ref 12–16)
IMM GRANULOCYTES NFR BLD AUTO: 0.4 % — HIGH (ref 0.1–0.3)
LYMPHOCYTES # BLD AUTO: 1.42 K/UL — SIGNIFICANT CHANGE UP (ref 1.2–3.4)
LYMPHOCYTES # BLD AUTO: 28.6 % — SIGNIFICANT CHANGE UP (ref 20.5–51.1)
MAGNESIUM SERPL-MCNC: 2 MG/DL — SIGNIFICANT CHANGE UP (ref 1.8–2.4)
MCHC RBC-ENTMCNC: 31.9 PG — HIGH (ref 27–31)
MCHC RBC-ENTMCNC: 33.7 G/DL — SIGNIFICANT CHANGE UP (ref 32–37)
MCV RBC AUTO: 94.6 FL — SIGNIFICANT CHANGE UP (ref 81–99)
MONOCYTES # BLD AUTO: 0.58 K/UL — SIGNIFICANT CHANGE UP (ref 0.1–0.6)
MONOCYTES NFR BLD AUTO: 11.7 % — HIGH (ref 1.7–9.3)
NEUTROPHILS # BLD AUTO: 2.75 K/UL — SIGNIFICANT CHANGE UP (ref 1.4–6.5)
NEUTROPHILS NFR BLD AUTO: 55.5 % — SIGNIFICANT CHANGE UP (ref 42.2–75.2)
NRBC # BLD: 0 /100 WBCS — SIGNIFICANT CHANGE UP (ref 0–0)
PLATELET # BLD AUTO: 234 K/UL — SIGNIFICANT CHANGE UP (ref 130–400)
PMV BLD: 10.7 FL — HIGH (ref 7.4–10.4)
POTASSIUM SERPL-MCNC: 4.3 MMOL/L — SIGNIFICANT CHANGE UP (ref 3.5–5)
POTASSIUM SERPL-MCNC: 5.7 MMOL/L — HIGH (ref 3.5–5)
POTASSIUM SERPL-SCNC: 4.3 MMOL/L — SIGNIFICANT CHANGE UP (ref 3.5–5)
POTASSIUM SERPL-SCNC: 5.7 MMOL/L — HIGH (ref 3.5–5)
PROT SERPL-MCNC: 5.2 G/DL — LOW (ref 6–8)
RBC # BLD: 3.7 M/UL — LOW (ref 4.2–5.4)
RBC # FLD: 13.4 % — SIGNIFICANT CHANGE UP (ref 11.5–14.5)
SODIUM SERPL-SCNC: 139 MMOL/L — SIGNIFICANT CHANGE UP (ref 135–146)
SODIUM SERPL-SCNC: 140 MMOL/L — SIGNIFICANT CHANGE UP (ref 135–146)
WBC # BLD: 4.96 K/UL — SIGNIFICANT CHANGE UP (ref 4.8–10.8)
WBC # FLD AUTO: 4.96 K/UL — SIGNIFICANT CHANGE UP (ref 4.8–10.8)

## 2024-10-17 PROCEDURE — 99232 SBSQ HOSP IP/OBS MODERATE 35: CPT | Mod: GC

## 2024-10-17 RX ORDER — SODIUM ZIRCONIUM CYCLOSILICATE 10 G/10G
5 POWDER, FOR SUSPENSION ORAL ONCE
Refills: 0 | Status: COMPLETED | OUTPATIENT
Start: 2024-10-17 | End: 2024-10-17

## 2024-10-17 RX ADMIN — Medication 0: at 11:39

## 2024-10-17 RX ADMIN — Medication 100 MILLIGRAM(S): at 05:27

## 2024-10-17 RX ADMIN — Medication 5000 UNIT(S): at 05:28

## 2024-10-17 RX ADMIN — Medication 81 MILLIGRAM(S): at 11:39

## 2024-10-17 RX ADMIN — SODIUM ZIRCONIUM CYCLOSILICATE 5 GRAM(S): 10 POWDER, FOR SUSPENSION ORAL at 12:27

## 2024-10-17 RX ADMIN — Medication 5000 UNIT(S): at 21:51

## 2024-10-17 RX ADMIN — Medication 100 MILLIGRAM(S): at 05:19

## 2024-10-17 RX ADMIN — Medication 5000 UNIT(S): at 13:19

## 2024-10-17 RX ADMIN — Medication 500 MILLIGRAM(S): at 13:19

## 2024-10-17 RX ADMIN — ROSUVASTATIN CALCIUM 10 MILLIGRAM(S): 20 TABLET, COATED ORAL at 21:52

## 2024-10-17 RX ADMIN — Medication 75 MILLILITER(S): at 12:50

## 2024-10-17 RX ADMIN — Medication 500 MILLIGRAM(S): at 21:52

## 2024-10-17 RX ADMIN — Medication 500 MILLIGRAM(S): at 05:27

## 2024-10-17 NOTE — PROGRESS NOTE ADULT - SUBJECTIVE AND OBJECTIVE BOX
24H events:    Patient is a 59y old Female who presents with a chief complaint of UTI, RUSSELL (16 Oct 2024 01:42)    Primary diagnosis of RUSSELL (acute kidney injury)    No acute or major events overnight.    Today is 1d of hospitalization. This morning patient was seen and examined at bedside, resting comfortably in bed.  No pain, SOB, or complaints. Says she feels fine.         PAST MEDICAL & SURGICAL HISTORY  HTN (hypertension)    Sciatica    No significant past surgical history      SOCIAL HISTORY:  Social History:      ALLERGIES:  Norvasc (Unknown)    MEDICATIONS:  STANDING MEDICATIONS  aspirin enteric coated 81 milliGRAM(s) Oral daily  cefTRIAXone   IVPB 1000 milliGRAM(s) IV Intermittent every 24 hours  dextrose 5%. 1000 milliLiter(s) IV Continuous <Continuous>  dextrose 5%. 1000 milliLiter(s) IV Continuous <Continuous>  dextrose 50% Injectable 12.5 Gram(s) IV Push once  dextrose 50% Injectable 25 Gram(s) IV Push once  dextrose 50% Injectable 25 Gram(s) IV Push once  glucagon  Injectable 1 milliGRAM(s) IntraMuscular once  heparin   Injectable 5000 Unit(s) SubCutaneous every 8 hours  hydrochlorothiazide 12.5 milliGRAM(s) Oral daily  insulin lispro (ADMELOG) corrective regimen sliding scale   SubCutaneous three times a day before meals  lactated ringers. 1000 milliLiter(s) IV Continuous <Continuous>  metoprolol succinate  milliGRAM(s) Oral daily  metroNIDAZOLE    Tablet 500 milliGRAM(s) Oral every 8 hours  rosuvastatin 10 milliGRAM(s) Oral at bedtime    PRN MEDICATIONS  acetaminophen     Tablet .. 650 milliGRAM(s) Oral every 6 hours PRN  aluminum hydroxide/magnesium hydroxide/simethicone Suspension 30 milliLiter(s) Oral every 4 hours PRN  dextrose Oral Gel 15 Gram(s) Oral once PRN  melatonin 3 milliGRAM(s) Oral at bedtime PRN  ondansetron Injectable 4 milliGRAM(s) IV Push every 8 hours PRN    VITALS:   T(F): 97.9  HR: 67  BP: 152/87  RR: 18  SpO2: 97%    PHYSICAL EXAM:  GENERAL:   ( x) NAD, lying in bed comfortably     (  ) obtunded     (  ) lethargic     (  ) somnolent      NECK:  (x) Supple     (  ) neck stiffness     (  ) nuchal rigidity     (  )  no JVD     (  ) JVD present ( -- cm)    HEART:  Rate -->     (x) normal rate     (  ) bradycardic     (  ) tachycardic  Rhythm -->     (x) regular     (  ) regularly irregular     (  ) irregularly irregular  Murmurs -->     (x) normal s1s2     (  ) systolic murmur     (  ) diastolic murmur     (  ) continuous murmur      (  ) S3 present     (  ) S4 present    LUNGS:   ( x)Unlabored respirations     (  ) tachypnea  ( x) B/L air entry     (  ) decreased breath sounds in:  (location     )    ( x) no adventitious sound     (  ) crackles     (  ) wheezing      (  ) rhonchi      (specify location:       )  (  ) chest wall tenderness (specify location:       )    ABDOMEN:   ( x) Soft     (  ) tense   |   (  ) nondistended     (  ) distended   |   (  ) +BS     (  ) hypoactive bowel sounds     (  ) hyperactive bowel sounds  ( x) nontender     (  ) RUQ tenderness     (  ) RLQ tenderness     (  ) LLQ tenderness     (  ) epigastric tenderness     (  ) diffuse tenderness  (  ) Splenomegaly      (  ) Hepatomegaly      (  ) Jaundice     (  ) ecchymosis     EXTREMITIES:  ( x) Normal     (  ) Rash     (  ) ecchymosis     (  ) varicose veins      (  ) pitting edema     (  ) non-pitting edema   (  ) ulceration     (  ) gangrene:     (location:     )    NERVOUS SYSTEM:    ( x) A&Ox3     (  ) confused     (  ) lethargic  CN II-XII:     ( x) Intact     (  ) deficits found     (Specify:     )   Upper extremities:     (  ) no sensorimotor deficits     (  ) weakness     (  ) loss of proprioception/vibration     (  ) loss of touch/temperature (specify:    )  Lower extremities:     (  ) no sensorimotor deficits     (  ) weakness     (  ) loss of proprioception/vibration     (  ) loss of touch/temperature (specify:    )    SKIN:   (  ) No rashes or lesions     (  ) maculopapular rash     (  ) pustules     (  ) vesicles     (  ) ulcer     (  ) ecchymosis     (specify location:     )      LABS:                        11.8   4.96  )-----------( 234      ( 17 Oct 2024 07:40 )             35.0     10-17    140  |  107  |  40[H]  ----------------------------<  116[H]  5.7[H]   |  24  |  2.0[H]    Ca    9.4      17 Oct 2024 07:40  Mg     2.0     10-17    TPro  5.2[L]  /  Alb  3.3[L]  /  TBili  0.3  /  DBili  x   /  AST  21  /  ALT  18  /  AlkPhos  49  10-17      Urinalysis Basic - ( 17 Oct 2024 07:40 )    Color: x / Appearance: x / SG: x / pH: x  Gluc: 116 mg/dL / Ketone: x  / Bili: x / Urobili: x   Blood: x / Protein: x / Nitrite: x   Leuk Esterase: x / RBC: x / WBC x   Sq Epi: x / Non Sq Epi: x / Bacteria: x            Urinalysis with Rflx Culture (collected 15 Oct 2024 23:50)              ASSESSMENT AND PLAN  60 yo F w/ hx of CVA ?15 yrs ago without any residual deficits. urine stones, HTN, HLD, DM, essential tremor referred to the ED by her PMD for elevated creat on routine testing. CT Abdomen and Pelvis w/ IV Cont from 10/12 showed : Acute sigmoid diverticulitis. No bowel obstruction or drainable collection. Indeterminate right renal hypodense lesion. Stable (0.7 cm pancreatic head hypodense lesion (was 0.4cm in 1/12/24)) pancreatic head hypodense lesion may represent a cyst. No definite duct dilatation. Labs: WBC wnl, BUN/Cr 61/3, UA with trace LAE compared to 10/12, Ucx from 10/12 growing citrobacter koseri.     #RUSSELL on CKD   - Likely contrast induced, improving  - IV gentle hydration   - holding enalapril  - US kidney and bladder: Non-obstructing right renal calculi measuring up to 0.5 cm. Post void urinary bladder residual volume of 0 mL      #UTI   #Citrobacter koseri  - IV rocephin   - F/U Ucx with sensitivites  - RBUS for stones    #Acute diverticulitis but asymptomatic  - IV flagyl     #HTN  #HLD  #DM  - C/W home meds   - holding enalapril  - FS & SSi    #Stable (0.7 cm pancreatic head hypodense lesion (was 0.4cm in 1/12/24)) pancreatic head hypodense lesion  - Per pt she had an MRI done in the previous year, consulted GI for the same and was told it was non concerning.   - Asked pt to bring records.     #Active smoker   - Counseled on cessation    #MISC  - Diet - DASH  - DVT - heparin  - GI prophy - na  - Activity - AAT         24H events:    Patient is a 59y old Female who presents with a chief complaint of UTI, RUSSELL (16 Oct 2024 01:42)    Primary diagnosis of RUSSELL (acute kidney injury)    No acute or major events overnight.    Today is 1d of hospitalization. This morning patient was seen and examined at bedside, resting comfortably in bed.  No pain, SOB, or complaints. Says she feels fine.         PAST MEDICAL & SURGICAL HISTORY  HTN (hypertension)    Sciatica    No significant past surgical history      SOCIAL HISTORY:  Social History:      ALLERGIES:  Norvasc (Unknown)    MEDICATIONS:  STANDING MEDICATIONS  aspirin enteric coated 81 milliGRAM(s) Oral daily  cefTRIAXone   IVPB 1000 milliGRAM(s) IV Intermittent every 24 hours  dextrose 5%. 1000 milliLiter(s) IV Continuous <Continuous>  dextrose 5%. 1000 milliLiter(s) IV Continuous <Continuous>  dextrose 50% Injectable 12.5 Gram(s) IV Push once  dextrose 50% Injectable 25 Gram(s) IV Push once  dextrose 50% Injectable 25 Gram(s) IV Push once  glucagon  Injectable 1 milliGRAM(s) IntraMuscular once  heparin   Injectable 5000 Unit(s) SubCutaneous every 8 hours  hydrochlorothiazide 12.5 milliGRAM(s) Oral daily  insulin lispro (ADMELOG) corrective regimen sliding scale   SubCutaneous three times a day before meals  lactated ringers. 1000 milliLiter(s) IV Continuous <Continuous>  metoprolol succinate  milliGRAM(s) Oral daily  metroNIDAZOLE    Tablet 500 milliGRAM(s) Oral every 8 hours  rosuvastatin 10 milliGRAM(s) Oral at bedtime    PRN MEDICATIONS  acetaminophen     Tablet .. 650 milliGRAM(s) Oral every 6 hours PRN  aluminum hydroxide/magnesium hydroxide/simethicone Suspension 30 milliLiter(s) Oral every 4 hours PRN  dextrose Oral Gel 15 Gram(s) Oral once PRN  melatonin 3 milliGRAM(s) Oral at bedtime PRN  ondansetron Injectable 4 milliGRAM(s) IV Push every 8 hours PRN    VITALS:   T(F): 97.9  HR: 67  BP: 152/87  RR: 18  SpO2: 97%    PHYSICAL EXAM:  GENERAL:   ( x) NAD, lying in bed comfortably     (  ) obtunded     (  ) lethargic     (  ) somnolent      NECK:  (x) Supple     (  ) neck stiffness     (  ) nuchal rigidity     (  )  no JVD     (  ) JVD present ( -- cm)    HEART:  Rate -->     (x) normal rate     (  ) bradycardic     (  ) tachycardic  Rhythm -->     (x) regular     (  ) regularly irregular     (  ) irregularly irregular  Murmurs -->     (x) normal s1s2     (  ) systolic murmur     (  ) diastolic murmur     (  ) continuous murmur      (  ) S3 present     (  ) S4 present    LUNGS:   ( x)Unlabored respirations     (  ) tachypnea  ( x) B/L air entry     (  ) decreased breath sounds in:  (location     )    ( x) no adventitious sound     (  ) crackles     (  ) wheezing      (  ) rhonchi      (specify location:       )  (  ) chest wall tenderness (specify location:       )    ABDOMEN:   ( x) Soft     (  ) tense   |   (  ) nondistended     (  ) distended   |   (  ) +BS     (  ) hypoactive bowel sounds     (  ) hyperactive bowel sounds  ( x) nontender     (  ) RUQ tenderness     (  ) RLQ tenderness     (  ) LLQ tenderness     (  ) epigastric tenderness     (  ) diffuse tenderness  (  ) Splenomegaly      (  ) Hepatomegaly      (  ) Jaundice     (  ) ecchymosis     EXTREMITIES:  ( x) Normal     (  ) Rash     (  ) ecchymosis     (  ) varicose veins      (  ) pitting edema     (  ) non-pitting edema   (  ) ulceration     (  ) gangrene:     (location:     )    NERVOUS SYSTEM:    ( x) A&Ox3     (  ) confused     (  ) lethargic  CN II-XII:     ( x) Intact     (  ) deficits found     (Specify:     )   Upper extremities:     (  ) no sensorimotor deficits     (  ) weakness     (  ) loss of proprioception/vibration     (  ) loss of touch/temperature (specify:    )  Lower extremities:     (  ) no sensorimotor deficits     (  ) weakness     (  ) loss of proprioception/vibration     (  ) loss of touch/temperature (specify:    )    SKIN:   (  ) No rashes or lesions     (  ) maculopapular rash     (  ) pustules     (  ) vesicles     (  ) ulcer     (  ) ecchymosis     (specify location:     )      LABS:                        11.8   4.96  )-----------( 234      ( 17 Oct 2024 07:40 )             35.0     10-17    140  |  107  |  40[H]  ----------------------------<  116[H]  5.7[H]   |  24  |  2.0[H]    Ca    9.4      17 Oct 2024 07:40  Mg     2.0     10-17    TPro  5.2[L]  /  Alb  3.3[L]  /  TBili  0.3  /  DBili  x   /  AST  21  /  ALT  18  /  AlkPhos  49  10-17      Urinalysis Basic - ( 17 Oct 2024 07:40 )    Color: x / Appearance: x / SG: x / pH: x  Gluc: 116 mg/dL / Ketone: x  / Bili: x / Urobili: x   Blood: x / Protein: x / Nitrite: x   Leuk Esterase: x / RBC: x / WBC x   Sq Epi: x / Non Sq Epi: x / Bacteria: x            Urinalysis with Rflx Culture (collected 15 Oct 2024 23:50)              ASSESSMENT AND PLAN  58 yo F w/ hx of CVA ?15 yrs ago without any residual deficits. urine stones, HTN, HLD, DM, essential tremor referred to the ED by her PMD for elevated creat on routine testing. CT Abdomen and Pelvis w/ IV Cont from 10/12 showed : Acute sigmoid diverticulitis. No bowel obstruction or drainable collection. Indeterminate right renal hypodense lesion. Stable (0.7 cm pancreatic head hypodense lesion (was 0.4cm in 1/12/24)) pancreatic head hypodense lesion may represent a cyst. No definite duct dilatation. Labs: WBC wnl, BUN/Cr 61/3, UA with trace LAE compared to 10/12, Ucx from 10/12 growing citrobacter koseri.     #RUSSELL on CKD   - Likely contrast induced, improving  - IV gentle hydration   - holding enalapril  - US kidney and bladder: Non-obstructing right renal calculi measuring up to 0.5 cm. Post void urinary bladder residual volume of 0 mL  UA- neg    # urine culture showing <50K Citrobacter koseri- from 10/12  - patient denies urinary symptoms  repeat UA- negative for bactermia; WBC- 7  - RBUS for stones    #Acute diverticulitis - on treatment  - IV flagyl and ceftriaxone    #HTN  #HLD  #DM  - C/W home meds   - holding enalapril  - FS & SSi    #Stable (0.7 cm pancreatic head hypodense lesion (was 0.4cm in 1/12/24)) pancreatic head hypodense lesion  - Per pt she had an MRI done in the previous year, consulted GI for the same and was told it was non concerning.   - Asked pt to bring records.     #Active smoker   - Counseled on cessation    #MISC  - Diet - DASH  - DVT - heparin  - GI prophy - na  - Activity - AAT

## 2024-10-17 NOTE — PROGRESS NOTE ADULT - REASON FOR ADMISSION
IMPRESSION:  ***    PLAN/RECOMMENDATION:   ***      Referring Provider:  Roderick Cardoza MD   Chief Complaint   Patient presents with   • Consultation     DALTON Brody is a 60 year old male, here today for evaluation of    ALLERGIES:  No Known Allergies   Current Outpatient Medications   Medication Sig Dispense Refill   • carbidopa-levodopa (SINEMET)  MG per tablet Take 1 tablet by mouth in the morning and 1 tablet at noon and 1 tablet in the evening. 269 tablet 0   • ondansetron (ZOFRAN ODT) 4 MG disintegrating tablet Take 1 tablet every 6 hours by oral route as needed for 7 days.     • guaiFENesin-codeine (GUAIFENESIN AC) 100-10 MG/5ML liquid GIVE 5 MILLILITERS BY MOUTH EVERY 4 HOURS FOR 7 DAYS     • fluticasone (FLONASE) 50 MCG/ACT nasal spray Inhale 2 sprays every day by intranasal route.     • hydroCORTisone (CORTIZONE) 2.5 % cream Apply topically 2 times daily. Apply to areas of rash 30 g 5   • atorvastatin (LIPITOR) 20 MG tablet Take 1 tablet by mouth nightly. 90 tablet 3   • ketoconazole (NIZORAL) 2 % shampoo Leave in for 5 minutes then rinse, to be used in the shower 3 days per week 120 mL 11   • fluocinonide (LIDEX) 0.05 % cream        No current facility-administered medications for this visit.     Facility-Administered Medications Ordered in Other Visits   Medication   • sodium chloride (PF) 0.9 % injection 10 mL      Past Medical History:   Diagnosis Date   • Deaf, bilateral    • Diverticulosis of colon 05/21/2018    dr melendez   • Hearing loss    • Hyperlipidemia    • Interstitial pneumonitis (CMD)       Past Surgical History:   Procedure Laterality Date   • Arthrotomy,open repair meniscus Right 09/14/2022   • Colonoscopy  05/21/2018    dr melendez   • Vasectomy     • Wrist surgery Right     benign cyst removed      Family History   Problem Relation Age of Onset   • Hearing Loss Sister      Social History     Tobacco Use   • Smoking status: Never   • Smokeless tobacco: Never   Vaping Use   • Vaping  Use: never used   Substance Use Topics   • Alcohol use: Yes     Comment: 1 per month   • Drug use: No         Alcohol Use: Yes                Comment: 1 per month            REVIEW OF SYSTEMS:  Neurology:  See HPI.              General: Pos. Neg. HEENT: Pos. Neg.        Fever  []  []       Vision Changes  []  []        Weight Changes  []  []      CV:   Resp:          Chest pain  []  []       Short of Breath  []  []        Palpitations  []  []       Cough  []  []   GI:    :          Constipation  []  []        Incontinence  []  []        Incontinence  []  []      Skin:     Heme:           Rash  []  []       Easy bleeding  []  []         Lesions  []  []      Easy bruising  []  []   Psych:   MS:          Anxiety  []  []      Joint pain  []  []        Depression  []  []      Back pain       Visit Vitals  /74   Pulse (!) 55   Wt 93.9 kg (207 lb)   SpO2 98%   BMI 25.20 kg/m²       EXAM:  General:  General appearance: No acute distress, comfortable.  Well groomed.  Head: atraumatic.   Eyes: bedside ophthalmologic examination demonstrates normal size and appearance of optic discs.  Normal appearing retinal vasculature/posterior segments grossly  Cardiac/Neck: No carotid bruits.  Heart currently sounds regular, no murmurs   Chest: lungs clear.  Breathing not labored  Peripheral: pulses normal in upper extremities .  Skin: No skin rashes on exposed areas.    Extremities: No swelling distally in limbs.     Neurological:    Mental status:   Orientation: oriented to person, place, and time  Gross memory: recall of recent and remote memory intact  Attention span and concentration: normal in conversation  Language: Expressive and receptive language function intact.  Speech pattern normal  Fund of knowledge: intact in conversation  Affect: normal affect    CN:   II: Peripheral visual fields intact to bedside confrontation testing.  Optic discs appear sharp and no abnormal retinal blood vessel abnormalities are noted on  UTI, RUSSELL funduscopic examination   III, IV, VI: Pupils equal and reactive from 3->2 mm bilaterally, eye movements full and normal with no nystagmus or choppy pursuits  V: Facial sensation normal to light touch in all 3 trigeminal distributions bilaterally  VII: Face symmetric.  No ptosis, no dysarthria  VIII: Hearing is intact grossly in conversation  IX:  Palate elevated symmetrically  XI: Shoulder shrug strength normal  XII: Tongue protrudes in midline and no tongue fasciculations    Motor: muscle bulk, tone, and strength normal in all 4 extremities.  No abnormal movements or tremor.  No pronator drift.  No cogwheeling or rigidity    Sensory: sensation intact to LT in all extremities. Romberg is negative for fall    Deep Tendon Reflexes: Grade      Toes downgoing bilaterally    Coordination. No ataxia or incoordination in UEs    Gait: Normal base. Gait features appeared normal with no syndromic features. Normal arm swing.    LABS:  ***    IMAGING:  ***        No diagnosis found.   No orders of the defined types were placed in this encounter.         The {PATIENT:[id number]::\"patient\"} is in full agreement with the plan and expresses understanding. Instructed to call if new or worsening neurological concerns or questions arise.     Medication side effects discussed, {PATIENT:[id number]::\"patient\"} in agreement and to notify if these or other side effects are experienced.     Thank you for the opportunity to take part in the care of your patient.  Please contact me with any questions or concerns.

## 2024-10-17 NOTE — PROGRESS NOTE ADULT - ATTENDING COMMENTS
notes reviewed and updated.    # RUSSELL  # diverticulitis  cont IV fluids  monitor BMP  lokelma one dose  cont antibiotics    # incidental findings  -Stable (0.7 cm pancreatic head hypodense lesion (was 0.4cm in 1/12/24)) pancreatic head hypodense lesion  - Indeterminate right renal hypodense lesion measuring 1 cm (series 3 image 175). Left renal cyst. Bilateral renal hypodensities too small to further   characterize  - OP follow up

## 2024-10-18 ENCOUNTER — TRANSCRIPTION ENCOUNTER (OUTPATIENT)
Age: 59
End: 2024-10-18

## 2024-10-18 VITALS
OXYGEN SATURATION: 98 % | TEMPERATURE: 98 F | HEART RATE: 60 BPM | SYSTOLIC BLOOD PRESSURE: 145 MMHG | DIASTOLIC BLOOD PRESSURE: 79 MMHG | RESPIRATION RATE: 18 BRPM

## 2024-10-18 LAB
ALBUMIN SERPL ELPH-MCNC: 3.4 G/DL — LOW (ref 3.5–5.2)
ALP SERPL-CCNC: 50 U/L — SIGNIFICANT CHANGE UP (ref 30–115)
ALT FLD-CCNC: 21 U/L — SIGNIFICANT CHANGE UP (ref 0–41)
ANION GAP SERPL CALC-SCNC: 9 MMOL/L — SIGNIFICANT CHANGE UP (ref 7–14)
AST SERPL-CCNC: 23 U/L — SIGNIFICANT CHANGE UP (ref 0–41)
BASOPHILS # BLD AUTO: 0.03 K/UL — SIGNIFICANT CHANGE UP (ref 0–0.2)
BASOPHILS NFR BLD AUTO: 0.6 % — SIGNIFICANT CHANGE UP (ref 0–1)
BILIRUB SERPL-MCNC: 0.2 MG/DL — SIGNIFICANT CHANGE UP (ref 0.2–1.2)
BUN SERPL-MCNC: 32 MG/DL — HIGH (ref 10–20)
CALCIUM SERPL-MCNC: 9.1 MG/DL — SIGNIFICANT CHANGE UP (ref 8.4–10.5)
CHLORIDE SERPL-SCNC: 107 MMOL/L — SIGNIFICANT CHANGE UP (ref 98–110)
CO2 SERPL-SCNC: 27 MMOL/L — SIGNIFICANT CHANGE UP (ref 17–32)
CREAT SERPL-MCNC: 2 MG/DL — HIGH (ref 0.7–1.5)
EGFR: 28 ML/MIN/1.73M2 — LOW
EOSINOPHIL # BLD AUTO: 0.16 K/UL — SIGNIFICANT CHANGE UP (ref 0–0.7)
EOSINOPHIL NFR BLD AUTO: 3.1 % — SIGNIFICANT CHANGE UP (ref 0–8)
GLUCOSE BLDC GLUCOMTR-MCNC: 115 MG/DL — HIGH (ref 70–99)
GLUCOSE BLDC GLUCOMTR-MCNC: 171 MG/DL — HIGH (ref 70–99)
GLUCOSE SERPL-MCNC: 117 MG/DL — HIGH (ref 70–99)
HCT VFR BLD CALC: 35.3 % — LOW (ref 37–47)
HGB BLD-MCNC: 11.7 G/DL — LOW (ref 12–16)
IMM GRANULOCYTES NFR BLD AUTO: 0.4 % — HIGH (ref 0.1–0.3)
LYMPHOCYTES # BLD AUTO: 1.75 K/UL — SIGNIFICANT CHANGE UP (ref 1.2–3.4)
LYMPHOCYTES # BLD AUTO: 34.2 % — SIGNIFICANT CHANGE UP (ref 20.5–51.1)
MAGNESIUM SERPL-MCNC: 1.9 MG/DL — SIGNIFICANT CHANGE UP (ref 1.8–2.4)
MCHC RBC-ENTMCNC: 31.6 PG — HIGH (ref 27–31)
MCHC RBC-ENTMCNC: 33.1 G/DL — SIGNIFICANT CHANGE UP (ref 32–37)
MCV RBC AUTO: 95.4 FL — SIGNIFICANT CHANGE UP (ref 81–99)
MONOCYTES # BLD AUTO: 0.63 K/UL — HIGH (ref 0.1–0.6)
MONOCYTES NFR BLD AUTO: 12.3 % — HIGH (ref 1.7–9.3)
NEUTROPHILS # BLD AUTO: 2.53 K/UL — SIGNIFICANT CHANGE UP (ref 1.4–6.5)
NEUTROPHILS NFR BLD AUTO: 49.4 % — SIGNIFICANT CHANGE UP (ref 42.2–75.2)
NRBC # BLD: 0 /100 WBCS — SIGNIFICANT CHANGE UP (ref 0–0)
PLATELET # BLD AUTO: 221 K/UL — SIGNIFICANT CHANGE UP (ref 130–400)
PMV BLD: 10.8 FL — HIGH (ref 7.4–10.4)
POTASSIUM SERPL-MCNC: 5.1 MMOL/L — HIGH (ref 3.5–5)
POTASSIUM SERPL-SCNC: 5.1 MMOL/L — HIGH (ref 3.5–5)
PROT SERPL-MCNC: 5.2 G/DL — LOW (ref 6–8)
RBC # BLD: 3.7 M/UL — LOW (ref 4.2–5.4)
RBC # FLD: 13.2 % — SIGNIFICANT CHANGE UP (ref 11.5–14.5)
SODIUM SERPL-SCNC: 143 MMOL/L — SIGNIFICANT CHANGE UP (ref 135–146)
WBC # BLD: 5.12 K/UL — SIGNIFICANT CHANGE UP (ref 4.8–10.8)
WBC # FLD AUTO: 5.12 K/UL — SIGNIFICANT CHANGE UP (ref 4.8–10.8)

## 2024-10-18 PROCEDURE — 99239 HOSP IP/OBS DSCHRG MGMT >30: CPT | Mod: GC

## 2024-10-18 RX ORDER — HYDRALAZINE HYDROCHLORIDE 100 MG/1
1 TABLET ORAL
Qty: 15 | Refills: 0
Start: 2024-10-18 | End: 2024-10-22

## 2024-10-18 RX ORDER — HYDROCHLOROTHIAZIDE 50 MG/1
1 TABLET ORAL
Refills: 0 | DISCHARGE

## 2024-10-18 RX ORDER — ENALAPRIL MALEATE 5 MG
1 TABLET ORAL
Refills: 0 | DISCHARGE

## 2024-10-18 RX ADMIN — Medication 5000 UNIT(S): at 13:16

## 2024-10-18 RX ADMIN — Medication 100 MILLIGRAM(S): at 05:33

## 2024-10-18 RX ADMIN — Medication 81 MILLIGRAM(S): at 11:30

## 2024-10-18 RX ADMIN — Medication 75 MILLILITER(S): at 06:10

## 2024-10-18 RX ADMIN — Medication 500 MILLIGRAM(S): at 13:16

## 2024-10-18 RX ADMIN — Medication 5000 UNIT(S): at 05:33

## 2024-10-18 RX ADMIN — Medication 500 MILLIGRAM(S): at 05:33

## 2024-10-18 NOTE — DISCHARGE NOTE NURSING/CASE MANAGEMENT/SOCIAL WORK - NSDCPEFALRISK_GEN_ALL_CORE
For information on Fall & Injury Prevention, visit: [email or web address] OR  [email or web address] OR  https://www.cdc.gov/steadi/patient.html

## 2024-10-18 NOTE — DISCHARGE NOTE PROVIDER - NSDCMRMEDTOKEN_GEN_ALL_CORE_FT
aspirin 81 mg oral tablet: orally once a day  ciprofloxacin 500 mg oral tablet: 1 tab(s) orally once a day please take 1 tab a day for 3 days  glipiZIDE 2.5 mg oral tablet, extended release: 1 tab(s) orally once a day  hydrALAZINE 10 mg oral tablet: 1 tab(s) orally twice a day after breakfast and dinner Please take 2 tablets daily. Hold if systolic blood pressure &lt;120, and take 1 extra pill daily if systolic blood pressure &gt;160  Januvia 100 mg oral tablet: 1 tab(s) orally once a day  metoprolol succinate 100 mg oral capsule, extended release: 1 cap(s) orally once a day  metroNIDAZOLE 500 mg oral tablet: 1 tab(s) orally every 8 hours please take 1 tab every 8 hours for 3 days  rosuvastatin 10 mg oral tablet: 1 tab(s) orally once a day

## 2024-10-18 NOTE — DISCHARGE NOTE PROVIDER - HOSPITAL COURSE
INCOMPLETE Patient is a 60 yo F w/ hx of CVA ?15 yrs ago without any residual deficits. urine stones, HTN, HLD, DM, essential tremor referred to the ED by her PMD for elevated creat on routine testing. She recently was diagnosed with diverticulitis and UTI, & DCed on PO ciprofloxacin/ flagyl, which had improved her urinary symptoms. She currently has no symptoms including any dysuria, diarrhea or abdominal pain. Apart from this pt has been an active smoker almost all her life and continues to smoke, and sees a pain specialist for sciatica     · Temp at ED Arrival (C)	36.8 Degrees C  · O2 Delivery/Oxygen Delivery Method	room air  · SpO2 (%)	96 %  · Temp site	oral  · Temp (C)	36.8 Degrees C  · Temp (F)	98.3 Degrees F  · Respiration Rate (breaths/min)	20 /min  · Heart Rate	90 /min  · BP Diastolic	 97 mm Hg  · BP Systolic	 188 mm Hg    CT Abdomen and Pelvis w/ IV Cont from 10/12 showed : Acute sigmoid diverticulitis. No bowel obstruction or drainable collection. Indeterminate right renal hypodense lesion. Recommend outpatient MRI renal with and without contrast for further evaluation.    Stable (0.7 cm pancreatic head hypodense lesion (was 0.4cm in 1/12/24)) pancreatic head hypodense lesion may represent a cyst. No definite duct dilatation. This can also be evaluated with MRI with MRCP.    Labs: WBC wnl, BUN/Cr 61/3, UA with trace LAE compared to 10/12, Ucx from 10/12 growing citrobacter koseri.       HOSPITAL COURSE:   60 yo F w/ hx of CVA ?15 yrs ago without any residual deficits. urine stones, HTN, HLD, DM, essential tremor referred to the ED by her PMD for elevated creat on routine testing. CT Abdomen and Pelvis w/ IV Cont from 10/12 showed : Acute sigmoid diverticulitis. No bowel obstruction or drainable collection. Indeterminate right renal hypodense lesion. Stable (0.7 cm pancreatic head hypodense lesion (was 0.4cm in 1/12/24)) pancreatic head hypodense lesion may represent a cyst. No definite duct dilatation. Labs: WBC wnl, BUN/Cr 61/3, UA with trace LAE compared to 10/12, Ucx from 10/12 growing citrobacter koseri.     #RUSSELL on CKD   - Likely contrast induced, improving  - IV gentle hydration   - holding enalapril and HCTZ, discharging on hydralazine 10mg BID   - US kidney and bladder: Non-obstructing right renal calculi measuring up to 0.5 cm. Post void urinary bladder residual volume of 0 mL  UA- neg    # urine culture showing <50K Citrobacter koseri- from 10/12  - patient denies urinary symptoms  repeat UA- negative for bactermia; WBC- 7  - RBUS for stones    #Acute diverticulitis - on treatment  - IV flagyl and ceftriaxone    #HTN  #HLD  #DM  - C/W home meds   - holding enalapril  - FS & SSi    #Stable (0.7 cm pancreatic head hypodense lesion (was 0.4cm in 1/12/24)) pancreatic head hypodense lesion  - Per pt she had an MRI done in the previous year, consulted GI for the same and was told it was non concerning.     #Active smoker   - Counseled on cessation           Patient is a 60 yo F w/ hx of CVA ?15 yrs ago without any residual deficits. urine stones, HTN, HLD, DM, essential tremor referred to the ED by her PMD for elevated creat on routine testing. She recently was diagnosed with diverticulitis and UTI, & DCed on PO ciprofloxacin/ flagyl, which had improved her urinary symptoms. She currently has no symptoms including any dysuria, diarrhea or abdominal pain. Apart from this pt has been an active smoker almost all her life and continues to smoke, and sees a pain specialist for sciatica     · Temp at ED Arrival (C)	36.8 Degrees C  · O2 Delivery/Oxygen Delivery Method	room air  · SpO2 (%)	96 %  · Temp site	oral  · Temp (C)	36.8 Degrees C  · Temp (F)	98.3 Degrees F  · Respiration Rate (breaths/min)	20 /min  · Heart Rate	90 /min  · BP Diastolic	 97 mm Hg  · BP Systolic	 188 mm Hg    CT Abdomen and Pelvis w/ IV Cont from 10/12 showed : Acute sigmoid diverticulitis. No bowel obstruction or drainable collection. Indeterminate right renal hypodense lesion. Recommend outpatient MRI renal with and without contrast for further evaluation.    Stable (0.7 cm pancreatic head hypodense lesion (was 0.4cm in 1/12/24)) pancreatic head hypodense lesion may represent a cyst. No definite duct dilatation. This can also be evaluated with MRI with MRCP.    Labs: WBC wnl, BUN/Cr 61/3, UA with trace LAE compared to 10/12, Ucx from 10/12 growing citrobacter koseri.       HOSPITAL COURSE:   60 yo F w/ hx of CVA ?15 yrs ago without any residual deficits. urine stones, HTN, HLD, DM, essential tremor referred to the ED by her PMD for elevated creat on routine testing. CT Abdomen and Pelvis w/ IV Cont from 10/12 showed : Acute sigmoid diverticulitis. No bowel obstruction or drainable collection. Indeterminate right renal hypodense lesion. Stable (0.7 cm pancreatic head hypodense lesion (was 0.4cm in 1/12/24)) pancreatic head hypodense lesion may represent a cyst. No definite duct dilatation. Labs: WBC wnl, BUN/Cr 61/3, UA with trace LAE compared to 10/12, Ucx from 10/12 growing citrobacter koseri.     #RUSSELL on CKD   - Likely contrast induced, improving  -s/p  IV gentle hydration   - holding enalapril and HCTZ, to check BMP with PCP and to restart medication if BMP stable  - US kidney and bladder: Non-obstructing right renal calculi measuring up to 0.5 cm. Post void urinary bladder residual volume of 0 mL  UA- neg    # urine culture showing <50K Citrobacter koseri- from 10/12  - patient denies urinary symptoms  repeat UA- negative for bacteria; WBC- 7  - RBUS for stones    #Acute diverticulitis - improved;   on treatment  - continue cipro 500 mg daily adjusted to renal function and flagyl    #HTN  #HLD  #DM  - C/W home meds   - holding enalapril  - FS & SSi    #Stable (0.7 cm pancreatic head hypodense lesion (was 0.4cm in 1/12/24)) pancreatic head hypodense lesion  - Per pt she had an MRI done in the previous year, consulted GI for the same and was told it was non concerning.     #Active smoker   - Counseled on cessation

## 2024-10-18 NOTE — DISCHARGE NOTE PROVIDER - CARE PROVIDER_API CALL
Agtaha Dunne  Internal Medicine  [address] [place] [id number]  Phone: [phone]  Fax: [phone]  Follow Up Time: 1 week   Agatha Dunne  Internal Medicine  [address] [place] [address]  Phone: [phone]  Fax: [phone]  Follow Up Time: 1 week    Zach Valente  Nephrology  [address] [place] [address]  Phone: [phone]  Fax: [phone]  Follow Up Time: 2 weeks    Kevin Becerra  Gastroenterology  [address] [place] [address]  Phone: [phone]  Fax: [phone]  Follow Up Time: 1 month

## 2024-10-18 NOTE — DISCHARGE NOTE NURSING/CASE MANAGEMENT/SOCIAL WORK - FINANCIAL ASSISTANCE
[facility] provides services at a reduced cost to those who are determined to be eligible through [facility]’s financial assistance program. Information regarding [facility]’s financial assistance program can be found by going to [email or web address] or by calling [phone].

## 2024-10-18 NOTE — DISCHARGE NOTE PROVIDER - DISCHARGING ATTENDING PHYSICIAN:
Pt had COVID exposure on 11/16. Pt started having symptoms on Saturday. At 4 am today pt woke up with croupy cough and shortness of breath which has continued on and off all day. COVID test done yesterday at urgent care. Pt also complaining of sore throat. Pt has felt warm, temp not checked at home. Pt had Motrin at 11 am.   
Dr Mcnamara

## 2024-10-18 NOTE — DISCHARGE NOTE PROVIDER - NSDCFUADDAPPT_GEN_ALL_CORE_FT
APPTS ARE READY TO BE MADE: [ ] YES    Best Family or Patient Contact (if needed):    Additional Information about above appointments (if needed):    1: [facility]   2:   3:     Other comments or requests:    APPTS ARE READY TO BE MADE: [ ] YES    Best Family or Patient Contact (if needed):    Additional Information about above appointments (if needed):    1: [facility]   2:[facility]    3:     Other comments or requests:    APPTS ARE READY TO BE MADE: [ ] YES    Best Family or Patient Contact (if needed):    Additional Information about above appointments (if needed):    1: [facility]   2: [facility]    3:     Other comments or requests:    APPTS ARE READY TO BE MADE: [ ] YES    Best Family or Patient Contact (if needed):    Additional Information about above appointments (if needed):    1: elisabet Valente  2: [facility]    3:     Other comments or requests:

## 2024-10-18 NOTE — DISCHARGE NOTE PROVIDER - NSDCCPCAREPLAN_GEN_ALL_CORE_FT
PRINCIPAL DISCHARGE DIAGNOSIS  Diagnosis: RUSSELL (acute kidney injury)  Assessment and Plan of Treatment: You were admitted with RUSSELL. YOu received IV fluids and your creatinine started trending back to normal levels.      SECONDARY DISCHARGE DIAGNOSES  Diagnosis: Hypertension  Assessment and Plan of Treatment: We stopped your BP medications ( hydrochlorotiazide and enalapril ) , and started you on hydralazine until your kidney numbers normalise , please follow up on resuming your BP meds     PRINCIPAL DISCHARGE DIAGNOSIS  Diagnosis: RUSSELL (acute kidney injury)  Assessment and Plan of Treatment: You were admitted with RUSSELL. YOu received IV fluids and your creatinine started trending back to normal levels. Please follow up with a nephrologist or primary care physician within 3-4 days to repeat your kidney function tests/BMP and blood pressure. Can resume your normal home blood pressure medications at their discretion.      SECONDARY DISCHARGE DIAGNOSES  Diagnosis: Hypertension  Assessment and Plan of Treatment: We stopped your BP medications ( hydrochlorotiazide and enalapril ) , and started you on hydralazine until your kidney numbers normalise , please follow up on resuming your BP meds.    Diagnosis: Acute diverticulitis  Assessment and Plan of Treatment: You were found to have acute diverticulitis. You were started on antibiotics. Please continue taking antibiotics as prescribed for 3 more days after discharge. Please follow up with gastroenterology in 6 weeks for GI checkup and possible colonoscopy.     PRINCIPAL DISCHARGE DIAGNOSIS  Diagnosis: RUSSELL (acute kidney injury)  Assessment and Plan of Treatment: You were admitted with RUSSELL. YOu received IV fluids and your creatinine started trending back to normal levels. Please follow up with a nephrologist or primary care physician within 3-4 days to repeat your kidney function tests/BMP and blood pressure. Can resume your normal home blood pressure medications at their discretion.      SECONDARY DISCHARGE DIAGNOSES  Diagnosis: Hypertension  Assessment and Plan of Treatment: We stopped your BP medications ( hydrochlorotiazide and enalapril ) , and started you on hydralazine until your kidney numbers normalise , please follow up on resuming your BP meds.    Diagnosis: Acute diverticulitis  Assessment and Plan of Treatment: You were found to have acute diverticulitis. You were started on antibiotics. Please continue taking antibiotics as prescribed for 3 more days after discharge. Please follow up with gastroenterology in 6 weeks for GI checkup and possible colonoscopy.    Diagnosis: Renal lesion  Assessment and Plan of Treatment: Indeterminate right renal hypodense lesion measuring 1 cm. Left   renal cyst. Bilateral renal hypodensities too small to further   characterize.  Renal lesions were noticed incidentally.  Follow up with PCP for monitoring and repeat imaging as OP      Diagnosis: Pancreatic lesion  Assessment and Plan of Treatment: Stable pancreatic head hypodense lesion may represent a cyst. No definite duct dilatation. This can also be evaluated with MRI with MRCP.  Follow up as OP

## 2024-10-18 NOTE — DISCHARGE NOTE NURSING/CASE MANAGEMENT/SOCIAL WORK - NSDCFUADDAPPT_GEN_ALL_CORE_FT
APPTS ARE READY TO BE MADE: [ ] YES    Best Family or Patient Contact (if needed):    Additional Information about above appointments (if needed):    1: elisabet Valente  2: [facility]    3:     Other comments or requests:

## 2024-10-18 NOTE — DISCHARGE NOTE PROVIDER - PROVIDER TOKENS
PROVIDER:[TOKEN:[[id number]],FOLLOWUP:[1 week]] PROVIDER:[TOKEN:[[id number]],FOLLOWUP:[1 week]],PROVIDER:[TOKEN:[[id number]],FOLLOWUP:[2 weeks]],PROVIDER:[TOKEN:[[id number]],FOLLOWUP:[1 month]]

## 2024-10-18 NOTE — DISCHARGE NOTE PROVIDER - NSDCFUADDINST_GEN_ALL_CORE_FT
Please follow up with your nephrologist and primary care provider for your elevated creatinine numbers  We stopped your BP medications ( hydrochlorotiazide and enalapril ) , and started you on hydralazine until your kidney numbers normalise , please follow up on resuming your BP meds . Please follow up with your nephrologist and primary care provider for your elevated creatinine numbers  We stopped your BP medications ( hydrochlorotiazide and enalapril ) , and started you on hydralazine until your kidney numbers normalise , please follow up on resuming your BP meds .  Please follow up with your gastroenterologists for your diverticulitis for possible colonoscopy   Please follow up with your nephrologist and primary care provider for your elevated creatinine numbers  We stopped your BP medications ( hydrochlorotiazide and enalapril ) , and started you on hydralazine until your kidney numbers normalise , please follow up on resuming your BP meds . (observe parameters listed on BP medication hydralazine - hold if systolic BP <120, take extra pill if systolic BP >160).  Please follow up with your gastroenterologists for your diverticulitis for possible colonoscopy .   Please follow up with your nephrologist and primary care provider for your elevated creatinine numbers  We stopped your BP medications ( hydrochlorotiazide and enalapril ) , and started you on hydralazine until your kidney numbers normalise , please follow up on resuming your BP meds . (observe parameters listed on BP medication hydralazine - hold if systolic BP <120, take extra pill if systolic BP >160).  Please follow up with your gastroenterologists for your diverticulitis for possible colonoscopy .    Follow up with nephrology

## 2024-10-18 NOTE — DISCHARGE NOTE PROVIDER - NSDCFUSCHEDAPPT_GEN_ALL_CORE_FT
Jerald Howard  [facility] Physician [facility]  CARDIOLOGY [address] [facility]  Scheduled Appointment: 12/26/2024

## 2024-10-18 NOTE — DISCHARGE NOTE PROVIDER - ATTENDING DISCHARGE PHYSICAL EXAMINATION:
PHYSICAL EXAM  GEN: no distress, comfortable  PULM: normal respiration  ABD: Soft  EXT: No lower extremity edema  NEURO: A&Ox3, moving all extremities

## 2024-10-18 NOTE — DISCHARGE NOTE NURSING/CASE MANAGEMENT/SOCIAL WORK - PATIENT PORTAL LINK FT
You can access the FollowMyHealth Patient Portal offered by [facility] by registering at the following website: [email or web address]. By joining [employer]’s FollowMyHealth portal, you will also be able to view your health information using other applications (apps) compatible with our system.

## 2024-10-18 NOTE — DISCHARGE NOTE PROVIDER - NSDCCAREPROVSEEN_GEN_ALL_CORE_FT
[facility] IM TEAM  [facility] Internal medicine TEAM

## 2024-10-18 NOTE — DISCHARGE NOTE PROVIDER - CARE PROVIDERS DIRECT ADDRESSES
,DirectAddress_Unknown ,DirectAddress_Unknown,DirectAddress_Unknown,[email or web address]

## 2024-10-25 DIAGNOSIS — N17.9 ACUTE KIDNEY FAILURE, UNSPECIFIED: ICD-10-CM

## 2024-10-25 DIAGNOSIS — K57.32 DIVERTICULITIS OF LARGE INTESTINE WITHOUT PERFORATION OR ABSCESS WITHOUT BLEEDING: ICD-10-CM

## 2024-10-25 DIAGNOSIS — Z86.73 PERSONAL HISTORY OF TRANSIENT ISCHEMIC ATTACK (TIA), AND CEREBRAL INFARCTION WITHOUT RESIDUAL DEFICITS: ICD-10-CM

## 2024-10-25 DIAGNOSIS — N18.9 CHRONIC KIDNEY DISEASE, UNSPECIFIED: ICD-10-CM

## 2024-10-25 DIAGNOSIS — N20.0 CALCULUS OF KIDNEY: ICD-10-CM

## 2024-10-25 DIAGNOSIS — K86.9 DISEASE OF PANCREAS, UNSPECIFIED: ICD-10-CM

## 2024-10-25 DIAGNOSIS — E11.22 TYPE 2 DIABETES MELLITUS WITH DIABETIC CHRONIC KIDNEY DISEASE: ICD-10-CM

## 2024-10-25 DIAGNOSIS — G25.0 ESSENTIAL TREMOR: ICD-10-CM

## 2024-10-25 DIAGNOSIS — I12.9 HYPERTENSIVE CHRONIC KIDNEY DISEASE WITH STAGE 1 THROUGH STAGE 4 CHRONIC KIDNEY DISEASE, OR UNSPECIFIED CHRONIC KIDNEY DISEASE: ICD-10-CM

## 2024-10-25 DIAGNOSIS — E78.5 HYPERLIPIDEMIA, UNSPECIFIED: ICD-10-CM

## 2024-10-25 DIAGNOSIS — N28.1 CYST OF KIDNEY, ACQUIRED: ICD-10-CM

## 2024-10-25 DIAGNOSIS — N39.0 URINARY TRACT INFECTION, SITE NOT SPECIFIED: ICD-10-CM

## 2024-10-25 DIAGNOSIS — Z79.82 LONG TERM (CURRENT) USE OF ASPIRIN: ICD-10-CM

## 2024-10-25 DIAGNOSIS — M54.30 SCIATICA, UNSPECIFIED SIDE: ICD-10-CM

## 2024-10-25 DIAGNOSIS — F17.210 NICOTINE DEPENDENCE, CIGARETTES, UNCOMPLICATED: ICD-10-CM

## 2024-10-25 DIAGNOSIS — Z88.8 ALLERGY STATUS TO OTHER DRUGS, MEDICAMENTS AND BIOLOGICAL SUBSTANCES: ICD-10-CM

## 2024-10-25 DIAGNOSIS — T50.8X5A ADVERSE EFFECT OF DIAGNOSTIC AGENTS, INITIAL ENCOUNTER: ICD-10-CM

## 2024-10-25 DIAGNOSIS — Z79.84 LONG TERM (CURRENT) USE OF ORAL HYPOGLYCEMIC DRUGS: ICD-10-CM

## 2024-12-23 ENCOUNTER — EMERGENCY (EMERGENCY)
Facility: HOSPITAL | Age: 59
LOS: 0 days | Discharge: HOME | End: 2024-12-23
Attending: STUDENT IN AN ORGANIZED HEALTH CARE EDUCATION/TRAINING PROGRAM
Payer: COMMERCIAL

## 2024-12-23 VITALS
OXYGEN SATURATION: 99 % | RESPIRATION RATE: 18 BRPM | DIASTOLIC BLOOD PRESSURE: 71 MMHG | HEART RATE: 100 BPM | SYSTOLIC BLOOD PRESSURE: 146 MMHG

## 2024-12-23 VITALS
TEMPERATURE: 98 F | SYSTOLIC BLOOD PRESSURE: 126 MMHG | DIASTOLIC BLOOD PRESSURE: 84 MMHG | HEART RATE: 89 BPM | WEIGHT: 194.01 LBS | RESPIRATION RATE: 19 BRPM | OXYGEN SATURATION: 99 %

## 2024-12-23 DIAGNOSIS — R10.32 LEFT LOWER QUADRANT PAIN: ICD-10-CM

## 2024-12-23 DIAGNOSIS — Z88.8 ALLERGY STATUS TO OTHER DRUGS, MEDICAMENTS AND BIOLOGICAL SUBSTANCES: ICD-10-CM

## 2024-12-23 DIAGNOSIS — K57.92 DIVERTICULITIS OF INTESTINE, PART UNSPECIFIED, WITHOUT PERFORATION OR ABSCESS WITHOUT BLEEDING: ICD-10-CM

## 2024-12-23 DIAGNOSIS — Z86.73 PERSONAL HISTORY OF TRANSIENT ISCHEMIC ATTACK (TIA), AND CEREBRAL INFARCTION WITHOUT RESIDUAL DEFICITS: ICD-10-CM

## 2024-12-23 DIAGNOSIS — I12.9 HYPERTENSIVE CHRONIC KIDNEY DISEASE WITH STAGE 1 THROUGH STAGE 4 CHRONIC KIDNEY DISEASE, OR UNSPECIFIED CHRONIC KIDNEY DISEASE: ICD-10-CM

## 2024-12-23 DIAGNOSIS — I10 ESSENTIAL (PRIMARY) HYPERTENSION: ICD-10-CM

## 2024-12-23 DIAGNOSIS — Z87.442 PERSONAL HISTORY OF URINARY CALCULI: ICD-10-CM

## 2024-12-23 DIAGNOSIS — E78.5 HYPERLIPIDEMIA, UNSPECIFIED: ICD-10-CM

## 2024-12-23 LAB
ALBUMIN SERPL ELPH-MCNC: 4 G/DL — SIGNIFICANT CHANGE UP (ref 3.5–5.2)
ALP SERPL-CCNC: 64 U/L — SIGNIFICANT CHANGE UP (ref 30–115)
ALT FLD-CCNC: 22 U/L — SIGNIFICANT CHANGE UP (ref 0–41)
ANION GAP SERPL CALC-SCNC: 11 MMOL/L — SIGNIFICANT CHANGE UP (ref 7–14)
APPEARANCE UR: CLEAR — SIGNIFICANT CHANGE UP
AST SERPL-CCNC: 25 U/L — SIGNIFICANT CHANGE UP (ref 0–41)
BACTERIA # UR AUTO: NEGATIVE /HPF — SIGNIFICANT CHANGE UP
BASOPHILS # BLD AUTO: 0.06 K/UL — SIGNIFICANT CHANGE UP (ref 0–0.2)
BASOPHILS NFR BLD AUTO: 0.5 % — SIGNIFICANT CHANGE UP (ref 0–1)
BILIRUB SERPL-MCNC: 0.3 MG/DL — SIGNIFICANT CHANGE UP (ref 0.2–1.2)
BILIRUB UR-MCNC: NEGATIVE — SIGNIFICANT CHANGE UP
BUN SERPL-MCNC: 37 MG/DL — HIGH (ref 10–20)
CALCIUM SERPL-MCNC: 9.3 MG/DL — SIGNIFICANT CHANGE UP (ref 8.4–10.5)
CAST: 3 /LPF — SIGNIFICANT CHANGE UP (ref 0–4)
CHLORIDE SERPL-SCNC: 105 MMOL/L — SIGNIFICANT CHANGE UP (ref 98–110)
CO2 SERPL-SCNC: 23 MMOL/L — SIGNIFICANT CHANGE UP (ref 17–32)
COLOR SPEC: YELLOW — SIGNIFICANT CHANGE UP
CREAT SERPL-MCNC: 1.9 MG/DL — HIGH (ref 0.7–1.5)
DIFF PNL FLD: ABNORMAL
EGFR: 30 ML/MIN/1.73M2 — LOW
EOSINOPHIL # BLD AUTO: 0.23 K/UL — SIGNIFICANT CHANGE UP (ref 0–0.7)
EOSINOPHIL NFR BLD AUTO: 1.8 % — SIGNIFICANT CHANGE UP (ref 0–8)
GLUCOSE SERPL-MCNC: 114 MG/DL — HIGH (ref 70–99)
GLUCOSE UR QL: NEGATIVE MG/DL — SIGNIFICANT CHANGE UP
HCG SERPL QL: NEGATIVE — SIGNIFICANT CHANGE UP
HCT VFR BLD CALC: 38.7 % — SIGNIFICANT CHANGE UP (ref 37–47)
HGB BLD-MCNC: 12.9 G/DL — SIGNIFICANT CHANGE UP (ref 12–16)
IMM GRANULOCYTES NFR BLD AUTO: 0.4 % — HIGH (ref 0.1–0.3)
KETONES UR-MCNC: NEGATIVE MG/DL — SIGNIFICANT CHANGE UP
LEUKOCYTE ESTERASE UR-ACNC: ABNORMAL
LIDOCAIN IGE QN: 92 U/L — HIGH (ref 7–60)
LYMPHOCYTES # BLD AUTO: 16.4 % — LOW (ref 20.5–51.1)
LYMPHOCYTES # BLD AUTO: 2.05 K/UL — SIGNIFICANT CHANGE UP (ref 1.2–3.4)
MCHC RBC-ENTMCNC: 31.2 PG — HIGH (ref 27–31)
MCHC RBC-ENTMCNC: 33.3 G/DL — SIGNIFICANT CHANGE UP (ref 32–37)
MCV RBC AUTO: 93.5 FL — SIGNIFICANT CHANGE UP (ref 81–99)
MONOCYTES # BLD AUTO: 0.73 K/UL — HIGH (ref 0.1–0.6)
MONOCYTES NFR BLD AUTO: 5.8 % — SIGNIFICANT CHANGE UP (ref 1.7–9.3)
NEUTROPHILS # BLD AUTO: 9.38 K/UL — HIGH (ref 1.4–6.5)
NEUTROPHILS NFR BLD AUTO: 75.1 % — SIGNIFICANT CHANGE UP (ref 42.2–75.2)
NITRITE UR-MCNC: NEGATIVE — SIGNIFICANT CHANGE UP
NRBC # BLD: 0 /100 WBCS — SIGNIFICANT CHANGE UP (ref 0–0)
PH UR: 5 — SIGNIFICANT CHANGE UP (ref 5–8)
PLATELET # BLD AUTO: 274 K/UL — SIGNIFICANT CHANGE UP (ref 130–400)
PMV BLD: 10.2 FL — SIGNIFICANT CHANGE UP (ref 7.4–10.4)
POTASSIUM SERPL-MCNC: 5.2 MMOL/L — HIGH (ref 3.5–5)
POTASSIUM SERPL-SCNC: 5.2 MMOL/L — HIGH (ref 3.5–5)
PROT SERPL-MCNC: 6.3 G/DL — SIGNIFICANT CHANGE UP (ref 6–8)
PROT UR-MCNC: 100 MG/DL
RBC # BLD: 4.14 M/UL — LOW (ref 4.2–5.4)
RBC # FLD: 12.8 % — SIGNIFICANT CHANGE UP (ref 11.5–14.5)
RBC CASTS # UR COMP ASSIST: 40 /HPF — HIGH (ref 0–4)
SODIUM SERPL-SCNC: 139 MMOL/L — SIGNIFICANT CHANGE UP (ref 135–146)
SP GR SPEC: 1.01 — SIGNIFICANT CHANGE UP (ref 1–1.03)
SQUAMOUS # UR AUTO: 3 /HPF — SIGNIFICANT CHANGE UP (ref 0–5)
UROBILINOGEN FLD QL: 0.2 MG/DL — SIGNIFICANT CHANGE UP (ref 0.2–1)
WBC # BLD: 12.5 K/UL — HIGH (ref 4.8–10.8)
WBC # FLD AUTO: 12.5 K/UL — HIGH (ref 4.8–10.8)
WBC UR QL: 4 /HPF — SIGNIFICANT CHANGE UP (ref 0–5)

## 2024-12-23 PROCEDURE — 74176 CT ABD & PELVIS W/O CONTRAST: CPT | Mod: 26,MC

## 2024-12-23 PROCEDURE — 83690 ASSAY OF LIPASE: CPT

## 2024-12-23 PROCEDURE — 87086 URINE CULTURE/COLONY COUNT: CPT

## 2024-12-23 PROCEDURE — 84703 CHORIONIC GONADOTROPIN ASSAY: CPT

## 2024-12-23 PROCEDURE — 36415 COLL VENOUS BLD VENIPUNCTURE: CPT

## 2024-12-23 PROCEDURE — 81001 URINALYSIS AUTO W/SCOPE: CPT

## 2024-12-23 PROCEDURE — 85025 COMPLETE CBC W/AUTO DIFF WBC: CPT

## 2024-12-23 PROCEDURE — 99284 EMERGENCY DEPT VISIT MOD MDM: CPT

## 2024-12-23 PROCEDURE — 96374 THER/PROPH/DIAG INJ IV PUSH: CPT

## 2024-12-23 PROCEDURE — 99284 EMERGENCY DEPT VISIT MOD MDM: CPT | Mod: 25

## 2024-12-23 PROCEDURE — 74176 CT ABD & PELVIS W/O CONTRAST: CPT | Mod: MC

## 2024-12-23 PROCEDURE — 80053 COMPREHEN METABOLIC PANEL: CPT

## 2024-12-23 RX ORDER — 0.9 % SODIUM CHLORIDE 0.9 %
1000 INTRAVENOUS SOLUTION INTRAVENOUS ONCE
Refills: 0 | Status: COMPLETED | OUTPATIENT
Start: 2024-12-23 | End: 2024-12-23

## 2024-12-23 RX ORDER — AMOXICILLIN/POTASSIUM CLAV 250-125 MG
1 TABLET ORAL
Qty: 14 | Refills: 0
Start: 2024-12-23 | End: 2024-12-29

## 2024-12-23 RX ORDER — KETOROLAC TROMETHAMINE 30 MG/ML
30 INJECTION INTRAMUSCULAR; INTRAVENOUS ONCE
Refills: 0 | Status: DISCONTINUED | OUTPATIENT
Start: 2024-12-23 | End: 2024-12-23

## 2024-12-23 RX ADMIN — KETOROLAC TROMETHAMINE 30 MILLIGRAM(S): 30 INJECTION INTRAMUSCULAR; INTRAVENOUS at 06:15

## 2024-12-23 RX ADMIN — Medication 1000 MILLILITER(S): at 06:16

## 2024-12-23 NOTE — ED PROVIDER NOTE - OBJECTIVE STATEMENT
59 yold female to Ed Pmhx Htn, HLd, cva w/o residual deficitis, Dm, essential tremor, kidney stones, ckd(creat 3.1) pt presents to Ed c/o abominal pain suprapubic and LLQ started 3 days ago with fever tmax 101, chills, diarrhea - similar to prior episodes of diverticulitis; pt denies n/v, back pain dysuria, frequency;

## 2024-12-23 NOTE — ED PROVIDER NOTE - PATIENT PORTAL LINK FT
You can access the FollowMyHealth Patient Portal offered by [facility] by registering at the following website: [email or web address]. By joining [employer]’s FollowMyHealth portal, you will also be able to view your health information using other applications (apps) compatible with our system.

## 2024-12-23 NOTE — ED PROVIDER NOTE - CARE PLAN
1 Principal Discharge DX:	Diverticulitis   Principal Discharge DX:	Diverticulitis  Assessment and plan of treatment:	llq pain  ct, labs, supportive care

## 2024-12-23 NOTE — ED PROVIDER NOTE - CLINICAL SUMMARY MEDICAL DECISION MAKING FREE TEXT BOX
Serial abdominal exams throughout ED observation period benign.  Labs and imaging reassuring. imaging w/ uncomplicated diverticulitis.  Advised pt to follow up closely  with PCP and GI.  Return precautions discussed. Pt understands plan and had opportunity to ask questions.  advised abx and liquid diet

## 2024-12-23 NOTE — ED PROVIDER NOTE - NS ED ROS FT
Constitutional: No fever, chills.  Eyes: No visual changes.  ENT: No hearing changes.  Neck: No neck pain or stiffness.  Cardiovascular: No chest pain, palpitations, edema.  Pulmonary: No SOB, cough. No hemoptysis.  Abdominal: seehpi  : No dysuria, frequency.  Neuro: No headache, syncope, dizziness.  MS: No back pain. No calf pain/swelling.  Psych: No suicidal ideations.

## 2024-12-23 NOTE — ED PROVIDER NOTE - PHYSICAL EXAMINATION
Constitutional: Well developed, well nourished. NAD  Head: Normocephalic, atraumatic.  Eyes: PERRL, EOMI.  ENT: No nasal discharge. Mucous membranes dry.  Neck: Supple. Painless ROM.  Cardiovascular:  Regular rate and rhythm.   Pulmonary:   Lungs clear to auscultation bilaterally   Abdominal: Soft mild tendernesss mid lower abdomen and LLQ no rebound, gurading or flank pain;  Extremities. Pelvis stable. No lower extremity edema, symmetric calves.  Skin: No rashes, cyanosis.  Neuro: AAOx3. No focal neurological deficits.  Psych: Normal mood. Normal affect.

## 2024-12-23 NOTE — ED PROVIDER NOTE - NSFOLLOWUPINSTRUCTIONS_ED_ALL_ED_FT
Have a liquid diet for the next week  Take antibiotics as prescribed  Follow-up with your primary care doctor and/or gastroenterology in a week  Return for any worsening pain, fevers, chills, blood in stool, inability to take medications or other concerning symptom    Diverticulitis    Diverticulitis is inflammation or infection of small pouches in your colon that form when you HAVE a condition called diverticulosis. This condition can range from mild to severe potentially leading to perforation or obstructions of your colon. Symptoms include abdominal pain, fever/chills, nausea, vomiting, diarrhea, constipation, or blood in your stool. If you were prescribed an antibiotic medicine, take it as told by your health care provider. Do not stop taking the antibiotic even if you start to feel better.    SEEK IMMEDIATE MEDICAL CARE IF YOU HAVE ANY OF THE FOLLOWING SYMPTOMS: worsening abdominal pain, high fever, inability to hold down liquids or medication, black or bloody stools, inability to pass gas, lightheadedness/dizziness, or a change in mental status.

## 2024-12-24 LAB
CULTURE RESULTS: SIGNIFICANT CHANGE UP
SPECIMEN SOURCE: SIGNIFICANT CHANGE UP

## 2024-12-26 ENCOUNTER — APPOINTMENT (OUTPATIENT)
Dept: CARDIOLOGY | Facility: CLINIC | Age: 59
End: 2024-12-26
Payer: COMMERCIAL

## 2024-12-26 ENCOUNTER — NON-APPOINTMENT (OUTPATIENT)
Age: 59
End: 2024-12-26

## 2024-12-26 VITALS
SYSTOLIC BLOOD PRESSURE: 122 MMHG | DIASTOLIC BLOOD PRESSURE: 70 MMHG | HEART RATE: 77 BPM | WEIGHT: 177 LBS | BODY MASS INDEX: 30.22 KG/M2 | HEIGHT: 64 IN

## 2024-12-26 DIAGNOSIS — M79.606 PAIN IN LEG, UNSPECIFIED: ICD-10-CM

## 2024-12-26 DIAGNOSIS — N18.9 CHRONIC KIDNEY DISEASE, UNSPECIFIED: ICD-10-CM

## 2024-12-26 DIAGNOSIS — E78.5 HYPERLIPIDEMIA, UNSPECIFIED: ICD-10-CM

## 2024-12-26 DIAGNOSIS — I10 ESSENTIAL (PRIMARY) HYPERTENSION: ICD-10-CM

## 2024-12-26 DIAGNOSIS — E11.9 TYPE 2 DIABETES MELLITUS W/OUT COMPLICATIONS: ICD-10-CM

## 2024-12-26 PROCEDURE — 93000 ELECTROCARDIOGRAM COMPLETE: CPT

## 2024-12-26 PROCEDURE — 99214 OFFICE O/P EST MOD 30 MIN: CPT | Mod: 25

## 2024-12-26 RX ORDER — GLIPIZIDE 2.5 MG/1
2.5 TABLET ORAL
Refills: 0 | Status: ACTIVE | COMMUNITY

## 2024-12-26 RX ORDER — LOSARTAN POTASSIUM AND HYDROCHLOROTHIAZIDE 12.5; 5 MG/1; MG/1
50-12.5 TABLET ORAL DAILY
Refills: 0 | Status: ACTIVE | COMMUNITY

## 2024-12-26 RX ORDER — HYDRALAZINE HYDROCHLORIDE 10 MG/1
10 TABLET ORAL
Refills: 0 | Status: ACTIVE | COMMUNITY

## 2025-02-23 ENCOUNTER — EMERGENCY (EMERGENCY)
Facility: HOSPITAL | Age: 60
LOS: 0 days | Discharge: HOME | End: 2025-02-23
Attending: STUDENT IN AN ORGANIZED HEALTH CARE EDUCATION/TRAINING PROGRAM
Payer: COMMERCIAL

## 2025-02-23 VITALS
TEMPERATURE: 98 F | WEIGHT: 169.98 LBS | HEART RATE: 86 BPM | OXYGEN SATURATION: 96 % | SYSTOLIC BLOOD PRESSURE: 147 MMHG | HEIGHT: 66 IN | DIASTOLIC BLOOD PRESSURE: 85 MMHG | RESPIRATION RATE: 18 BRPM

## 2025-02-23 DIAGNOSIS — Z87.19 PERSONAL HISTORY OF OTHER DISEASES OF THE DIGESTIVE SYSTEM: ICD-10-CM

## 2025-02-23 DIAGNOSIS — R10.32 LEFT LOWER QUADRANT PAIN: ICD-10-CM

## 2025-02-23 DIAGNOSIS — K57.92 DIVERTICULITIS OF INTESTINE, PART UNSPECIFIED, WITHOUT PERFORATION OR ABSCESS WITHOUT BLEEDING: ICD-10-CM

## 2025-02-23 DIAGNOSIS — Z88.8 ALLERGY STATUS TO OTHER DRUGS, MEDICAMENTS AND BIOLOGICAL SUBSTANCES: ICD-10-CM

## 2025-02-23 DIAGNOSIS — N18.9 CHRONIC KIDNEY DISEASE, UNSPECIFIED: ICD-10-CM

## 2025-02-23 LAB
ALBUMIN SERPL ELPH-MCNC: 4 G/DL — SIGNIFICANT CHANGE UP (ref 3.5–5.2)
ALP SERPL-CCNC: 74 U/L — SIGNIFICANT CHANGE UP (ref 30–115)
ALT FLD-CCNC: 19 U/L — SIGNIFICANT CHANGE UP (ref 0–41)
ANION GAP SERPL CALC-SCNC: 10 MMOL/L — SIGNIFICANT CHANGE UP (ref 7–14)
APPEARANCE UR: CLEAR — SIGNIFICANT CHANGE UP
AST SERPL-CCNC: 21 U/L — SIGNIFICANT CHANGE UP (ref 0–41)
BACTERIA # UR AUTO: NEGATIVE /HPF — SIGNIFICANT CHANGE UP
BASE EXCESS BLDV CALC-SCNC: -3.8 MMOL/L — LOW (ref -2–3)
BASOPHILS # BLD AUTO: 0.04 K/UL — SIGNIFICANT CHANGE UP (ref 0–0.2)
BASOPHILS NFR BLD AUTO: 0.5 % — SIGNIFICANT CHANGE UP (ref 0–1)
BILIRUB DIRECT SERPL-MCNC: <0.2 MG/DL — SIGNIFICANT CHANGE UP (ref 0–0.3)
BILIRUB INDIRECT FLD-MCNC: SIGNIFICANT CHANGE UP MG/DL (ref 0.2–1.2)
BILIRUB SERPL-MCNC: <0.2 MG/DL — SIGNIFICANT CHANGE UP (ref 0.2–1.2)
BILIRUB UR-MCNC: NEGATIVE — SIGNIFICANT CHANGE UP
BUN SERPL-MCNC: 47 MG/DL — HIGH (ref 10–20)
CA-I SERPL-SCNC: 1.33 MMOL/L — SIGNIFICANT CHANGE UP (ref 1.15–1.33)
CALCIUM SERPL-MCNC: 9.6 MG/DL — SIGNIFICANT CHANGE UP (ref 8.4–10.4)
CAST: 0 /LPF — SIGNIFICANT CHANGE UP (ref 0–4)
CHLORIDE SERPL-SCNC: 106 MMOL/L — SIGNIFICANT CHANGE UP (ref 98–110)
CO2 SERPL-SCNC: 24 MMOL/L — SIGNIFICANT CHANGE UP (ref 17–32)
COLOR SPEC: YELLOW — SIGNIFICANT CHANGE UP
CREAT SERPL-MCNC: 1.9 MG/DL — HIGH (ref 0.7–1.5)
DIFF PNL FLD: ABNORMAL
EGFR: 30 ML/MIN/1.73M2 — LOW
EOSINOPHIL # BLD AUTO: 0.29 K/UL — SIGNIFICANT CHANGE UP (ref 0–0.7)
EOSINOPHIL NFR BLD AUTO: 3.7 % — SIGNIFICANT CHANGE UP (ref 0–8)
GAS PNL BLDV: 134 MMOL/L — LOW (ref 136–145)
GAS PNL BLDV: SIGNIFICANT CHANGE UP
GLUCOSE SERPL-MCNC: 103 MG/DL — HIGH (ref 70–99)
GLUCOSE UR QL: NEGATIVE MG/DL — SIGNIFICANT CHANGE UP
HCO3 BLDV-SCNC: 25 MMOL/L — SIGNIFICANT CHANGE UP (ref 22–29)
HCT VFR BLD CALC: 39.6 % — SIGNIFICANT CHANGE UP (ref 37–47)
HCT VFR BLDA CALC: 47 % — HIGH (ref 34.5–46.5)
HGB BLD CALC-MCNC: 15.7 G/DL — SIGNIFICANT CHANGE UP (ref 11.7–16.1)
HGB BLD-MCNC: 12.9 G/DL — SIGNIFICANT CHANGE UP (ref 12–16)
IMM GRANULOCYTES NFR BLD AUTO: 0.5 % — HIGH (ref 0.1–0.3)
KETONES UR-MCNC: NEGATIVE MG/DL — SIGNIFICANT CHANGE UP
LACTATE BLDV-MCNC: 2.2 MMOL/L — HIGH (ref 0.5–2)
LEUKOCYTE ESTERASE UR-ACNC: NEGATIVE — SIGNIFICANT CHANGE UP
LIDOCAIN IGE QN: 104 U/L — HIGH (ref 7–60)
LYMPHOCYTES # BLD AUTO: 2.05 K/UL — SIGNIFICANT CHANGE UP (ref 1.2–3.4)
LYMPHOCYTES # BLD AUTO: 25.9 % — SIGNIFICANT CHANGE UP (ref 20.5–51.1)
MCHC RBC-ENTMCNC: 30.4 PG — SIGNIFICANT CHANGE UP (ref 27–31)
MCHC RBC-ENTMCNC: 32.6 G/DL — SIGNIFICANT CHANGE UP (ref 32–37)
MCV RBC AUTO: 93.4 FL — SIGNIFICANT CHANGE UP (ref 81–99)
MONOCYTES # BLD AUTO: 0.72 K/UL — HIGH (ref 0.1–0.6)
MONOCYTES NFR BLD AUTO: 9.1 % — SIGNIFICANT CHANGE UP (ref 1.7–9.3)
NEUTROPHILS # BLD AUTO: 4.78 K/UL — SIGNIFICANT CHANGE UP (ref 1.4–6.5)
NEUTROPHILS NFR BLD AUTO: 60.3 % — SIGNIFICANT CHANGE UP (ref 42.2–75.2)
NITRITE UR-MCNC: NEGATIVE — SIGNIFICANT CHANGE UP
NRBC BLD AUTO-RTO: 0 /100 WBCS — SIGNIFICANT CHANGE UP (ref 0–0)
PCO2 BLDV: 62 MMHG — HIGH (ref 39–42)
PH BLDV: 7.22 — LOW (ref 7.32–7.43)
PH UR: 5.5 — SIGNIFICANT CHANGE UP (ref 5–8)
PLATELET # BLD AUTO: 259 K/UL — SIGNIFICANT CHANGE UP (ref 130–400)
PMV BLD: 10.8 FL — HIGH (ref 7.4–10.4)
PO2 BLDV: 41 MMHG — SIGNIFICANT CHANGE UP (ref 25–45)
POTASSIUM BLDV-SCNC: 4.8 MMOL/L — SIGNIFICANT CHANGE UP (ref 3.5–5.1)
POTASSIUM SERPL-MCNC: 5.4 MMOL/L — HIGH (ref 3.5–5)
POTASSIUM SERPL-SCNC: 5.4 MMOL/L — HIGH (ref 3.5–5)
PROT SERPL-MCNC: 6.5 G/DL — SIGNIFICANT CHANGE UP (ref 6–8)
PROT UR-MCNC: 30 MG/DL
RBC # BLD: 4.24 M/UL — SIGNIFICANT CHANGE UP (ref 4.2–5.4)
RBC # FLD: 13.8 % — SIGNIFICANT CHANGE UP (ref 11.5–14.5)
RBC CASTS # UR COMP ASSIST: 2 /HPF — SIGNIFICANT CHANGE UP (ref 0–4)
SAO2 % BLDV: 69.6 % — SIGNIFICANT CHANGE UP (ref 67–88)
SODIUM SERPL-SCNC: 140 MMOL/L — SIGNIFICANT CHANGE UP (ref 135–146)
SP GR SPEC: 1.01 — SIGNIFICANT CHANGE UP (ref 1–1.03)
SQUAMOUS # UR AUTO: 1 /HPF — SIGNIFICANT CHANGE UP (ref 0–5)
UROBILINOGEN FLD QL: 0.2 MG/DL — SIGNIFICANT CHANGE UP (ref 0.2–1)
WBC # BLD: 7.92 K/UL — SIGNIFICANT CHANGE UP (ref 4.8–10.8)
WBC # FLD AUTO: 7.92 K/UL — SIGNIFICANT CHANGE UP (ref 4.8–10.8)
WBC UR QL: 1 /HPF — SIGNIFICANT CHANGE UP (ref 0–5)

## 2025-02-23 PROCEDURE — 87086 URINE CULTURE/COLONY COUNT: CPT

## 2025-02-23 PROCEDURE — 99284 EMERGENCY DEPT VISIT MOD MDM: CPT | Mod: 25

## 2025-02-23 PROCEDURE — 81001 URINALYSIS AUTO W/SCOPE: CPT

## 2025-02-23 PROCEDURE — 80076 HEPATIC FUNCTION PANEL: CPT

## 2025-02-23 PROCEDURE — 74176 CT ABD & PELVIS W/O CONTRAST: CPT | Mod: 26

## 2025-02-23 PROCEDURE — 82330 ASSAY OF CALCIUM: CPT

## 2025-02-23 PROCEDURE — 85018 HEMOGLOBIN: CPT

## 2025-02-23 PROCEDURE — 84295 ASSAY OF SERUM SODIUM: CPT

## 2025-02-23 PROCEDURE — 83605 ASSAY OF LACTIC ACID: CPT

## 2025-02-23 PROCEDURE — 99285 EMERGENCY DEPT VISIT HI MDM: CPT

## 2025-02-23 PROCEDURE — 83690 ASSAY OF LIPASE: CPT

## 2025-02-23 PROCEDURE — 85025 COMPLETE CBC W/AUTO DIFF WBC: CPT

## 2025-02-23 PROCEDURE — 36415 COLL VENOUS BLD VENIPUNCTURE: CPT

## 2025-02-23 PROCEDURE — 85014 HEMATOCRIT: CPT

## 2025-02-23 PROCEDURE — 80048 BASIC METABOLIC PNL TOTAL CA: CPT

## 2025-02-23 PROCEDURE — 82803 BLOOD GASES ANY COMBINATION: CPT

## 2025-02-23 PROCEDURE — 84132 ASSAY OF SERUM POTASSIUM: CPT

## 2025-02-23 PROCEDURE — 74176 CT ABD & PELVIS W/O CONTRAST: CPT | Mod: MC

## 2025-02-23 PROCEDURE — 96374 THER/PROPH/DIAG INJ IV PUSH: CPT

## 2025-02-23 RX ORDER — BACTERIOSTATIC SODIUM CHLORIDE 0.9 %
1000 VIAL (ML) INJECTION ONCE
Refills: 0 | Status: COMPLETED | OUTPATIENT
Start: 2025-02-23 | End: 2025-02-23

## 2025-02-23 RX ORDER — IOHEXOL 350 MG/ML
30 INJECTION, SOLUTION INTRAVENOUS ONCE
Refills: 0 | Status: COMPLETED | OUTPATIENT
Start: 2025-02-23 | End: 2025-02-23

## 2025-02-23 RX ORDER — METRONIDAZOLE 500 MG
1 TABLET ORAL
Qty: 14 | Refills: 0
Start: 2025-02-23 | End: 2025-03-01

## 2025-02-23 RX ORDER — MORPHINE SULFATE 60 MG/1
4 TABLET, FILM COATED, EXTENDED RELEASE ORAL ONCE
Refills: 0 | Status: DISCONTINUED | OUTPATIENT
Start: 2025-02-23 | End: 2025-02-23

## 2025-02-23 RX ORDER — CIPROFLOXACIN HCL 500 MG
1 TABLET ORAL
Qty: 14 | Refills: 0
Start: 2025-02-23 | End: 2025-03-01

## 2025-02-23 RX ADMIN — MORPHINE SULFATE 4 MILLIGRAM(S): 60 TABLET, FILM COATED, EXTENDED RELEASE ORAL at 04:31

## 2025-02-23 RX ADMIN — IOHEXOL 30 MILLILITER(S): 350 INJECTION, SOLUTION INTRAVENOUS at 04:11

## 2025-02-23 RX ADMIN — Medication 1000 MILLILITER(S): at 07:06

## 2025-02-23 NOTE — ED PROVIDER NOTE - PATIENT PORTAL LINK FT
You can access the FollowMyHealth Patient Portal offered by [facility] by registering at the following website: [email or web address]. By joining [employer]’s FollowMyHealth portal, you will also be able to view your health information using other applications (apps) compatible with our system.

## 2025-02-23 NOTE — ED ADULT NURSE NOTE - AVIAN FLU SYMPTOMS
05/31/24      Shyann Wheeler  No Current Address  [place] [address]      To Whom It May Concern,    Shyann Wheeler was seen in the hospital from 5/30/24 - 5/31/-24. Please excuse her from work and other activities during this time.     Sincerely,         Toy Wyatt, [address] OBGYN  [address] [facility] [address]  Phone: [phone]  Fax: [phone]            
No

## 2025-02-23 NOTE — ED PROVIDER NOTE - NSFOLLOWUPINSTRUCTIONS_ED_ALL_ED_FT
WHAT YOU NEED TO KNOW:    What is diverticulitis? Diverticulitis is a condition that causes small pockets along your intestine called diverticula to become inflamed or infected. This is caused by hard bowel movement, food, or bacteria that get stuck in the pockets.  Diverticula    What are the signs and symptoms of diverticulitis?    Pain in the lower left side of your abdomen    Fever and chills    Nausea or vomiting    Constipation or diarrhea    An urge to urinate or have a bowel movement more often than usual    Bloody bowel movements    Bloating and gas  How is diverticulitis diagnosed? Your healthcare provider will examine you. He or she will ask questions about your symptoms and health history. You may need any of the following:    Blood and urine tests may show signs of infection or inflammation.    CT, ultrasound, or MRI pictures may be used to find problems in your intestines. You may be given contrast liquid to help your intestines show up better in the pictures. Tell the healthcare provider if you have ever had an allergic reaction to contrast liquid. Do not enter the MRI room with anything metal. The MRI machine uses a powerful magnet. Metal can cause serious injury from the magnet. Tell a healthcare provider if you have any metal in or on your body.    A colonoscopy is used to look at your intestines with a scope. A scope (long bendable tube with a light on the end) is used to take pictures. This test may show swollen diverticula or bleeding. Samples may be taken from your digestive tract and sent to a lab for tests. Bleeding may be controlled with tools that are inserted through the scope.    How is diverticulitis treated? Mild diverticulitis can be treated at home. You may need to rest and follow a clear liquid diet until your diverticulitis gets better. You will be admitted to the hospital if you have severe diverticulitis. You may need any of the following:    Antibiotics help treat or prevent a bacterial infection.    Prescription pain medicine may be given. Ask your healthcare provider how to take this medicine safely. Some prescription pain medicines contain acetaminophen. Do not take other medicines that contain acetaminophen without talking to your healthcare provider. Too much acetaminophen may cause liver damage. Prescription pain medicine may cause constipation. Ask your healthcare provider how to prevent or treat constipation.    An IV may be used to give you liquids and nutrition. You may not be able to eat or drink anything until your healthcare provider says it is okay.    Drainage may be done to reduce inflammation or treat infection. Your healthcare provider may insert a small tube through an incision in your abdomen to drain pus from infected diverticula.    Surgery may be needed if there is a hole in your bowel or a large amount of swelling. A healthcare provider will remove the infected or inflamed areas of your colon.  How can I manage my symptoms? A clear liquid diet will allow your intestines to heal. A clear liquid diet includes any liquids that you can see through. Examples include water, ginger-leesa, cranberry or apple juice, frozen fruit ice, or broth. Ask your healthcare provider for more information on a clear liquid diet.    When should I seek immediate care?    You have bowel movement or foul-smelling discharge leaking from your vagina or in your urine.    You have severe diarrhea.    You urinate less than usual or not at all.    You are not able to have a bowel movement.    You cannot stop vomiting.    You have severe abdominal pain, a fever, and your abdomen is larger than usual.    You have new or increased blood in your bowel movements.  When should I call my doctor?    You have pain when you urinate.    Your symptoms get worse or do not go away.    You have questions or concerns about your condition or care.          Our Emergency Department Referral Coordinators will be reaching out to you in the next 24-48 hours from 9:00am to 5:00pm with a follow up appointment. Please expect a phone call from the hospital in that time frame. If you do not receive a call or if you have any questions or concerns, you can reach them at   [phone]

## 2025-02-23 NOTE — ED ADULT NURSE NOTE - NSFALLRISKFACTORS_ED_ALL_ED
Problem: Patient Care Overview  Goal: Plan of Care Review  Outcome: Ongoing (interventions implemented as appropriate)   10/18/19 1513   Coping/Psychosocial   Plan of Care Reviewed With patient   Plan of Care Review   Progress improving   OTHER   Outcome Summary Patient has been able to ambuate in hallway aprox 750 ft. Covaderm dressing CDI. BP has improved since receiving 1000 ml bolus early this morning. small amount of output from DEBBI drain denies numbness. has been voiding spontanously pain managed with PRN percocet last administered at 1045. Possible d/c tomorrow will continue to monitor.        Problem: Fall Risk (Adult)  Goal: Identify Related Risk Factors and Signs and Symptoms  Outcome: Ongoing (interventions implemented as appropriate)   10/18/19 1513   Fall Risk (Adult)   Related Risk Factors (Fall Risk) fatigue/slow reaction;gait/mobility problems;environment unfamiliar;history of falls   Signs and Symptoms (Fall Risk) presence of risk factors     Goal: Absence of Fall  Outcome: Outcome(s) achieved Date Met: 10/18/19   10/18/19 1513   Fall Risk (Adult)   Absence of Fall achieves outcome       Problem: Laminectomy/Foraminotomy/Discectomy (Adult)  Goal: Signs and Symptoms of Listed Potential Problems Will be Absent, Minimized or Managed (Laminectomy/Foraminotomy/Discectomy)  Outcome: Ongoing (interventions implemented as appropriate)   10/18/19 1513   Goal/Outcome Evaluation   Problems Assessed (Laminectomy/Laminotomy/Discectomy) all   Problems Present (Laminectomy/otomy) pain;functional decline/self-care deficit;situational response          No indicators present

## 2025-02-23 NOTE — ED PROVIDER NOTE - OBJECTIVE STATEMENT
Patient is c/o abd pain, lower abd pain x 4 days. Patient with h/o diverticulitis with similar symptoms in the past. Patient denies f/c/cp/sob/URI symptoms. Denies urinary symptoms. No rash. Patient with h/o CKD and denies alcohol or drug use. Abd pain is more prominent in LLQ area, constant pain, no particular aggravating/relieving factors noted. Patient came to ED for persistent abd pain.

## 2025-02-23 NOTE — ED PROVIDER NOTE - PROGRESS NOTE DETAILS
Patient remained stable in ED, signed out to Dr. Magallon at 5 am shift change rounds. Received signout on patient.  Patient with 4 days of progressive left lower quadrant pain.  On exam has moderate focal left lower quadrant tenderness.  No peritonitis.  Disposition pending remainder of workup and reassessment.

## 2025-02-23 NOTE — ED PROVIDER NOTE - CLINICAL SUMMARY MEDICAL DECISION MAKING FREE TEXT BOX
pt has hx of diveticultiis here with similar sx. CT with diverticulitis. abx sent - requested cipro and flagyl. also noted some urinary discomfort. abx will cover if there is also uti. urine study sent.

## 2025-02-23 NOTE — ED PROVIDER NOTE - DIFFERENTIAL DIAGNOSIS
Differential Diagnosis Pancreatitis, hepatic failure, obstructive uropathy, diverticulitis, colitis, abscess, bowel obstruction, bowel perforation. Electrolyte abnormalities, RUSSELL, and GI bleeding.

## 2025-02-23 NOTE — ED ADULT TRIAGE NOTE - HISTORY OF COVID-19 VACCINATION
Pt w/ recs for h/h - pt expected to resume w/ [facility] h/h at d/c.     03/07/19 1234   Discharge Reassessment   Assessment Type Discharge Planning Reassessment   Provided patient/caregiver education on the expected discharge date and the discharge plan No   Do you have any problems affording any of your prescribed medications? No   Discharge Plan A Home with family;Home Health   Discharge Plan B Home with family   Anticipated Discharge Disposition Home-Health   Can the patient answer the patient profile reliably? Yes, cognitively intact   Describe the patient's ability to walk at the present time. Walks with the help of equipment      Yes

## 2025-02-24 LAB
CULTURE RESULTS: NO GROWTH — SIGNIFICANT CHANGE UP
SPECIMEN SOURCE: SIGNIFICANT CHANGE UP

## 2025-04-17 NOTE — ED PROVIDER NOTE - IV ALTEPLASE DOOR HIDDEN
Patient has signed the consent form. The applicable research collection order(s) have been created.    Patient is ready for scheduling.   show

## 2025-04-29 NOTE — ED ADULT NURSE NOTE - NSFALLOOBATTEMPT_ED_ALL_ED
Radha my advocate lucia ashwini on cell phone, laptop or desktop or tablet. I have Wednesday off every week.     
No

## 2025-08-07 ENCOUNTER — APPOINTMENT (OUTPATIENT)
Dept: CARDIOLOGY | Facility: CLINIC | Age: 60
End: 2025-08-07